# Patient Record
Sex: FEMALE | Race: WHITE | NOT HISPANIC OR LATINO | URBAN - METROPOLITAN AREA
[De-identification: names, ages, dates, MRNs, and addresses within clinical notes are randomized per-mention and may not be internally consistent; named-entity substitution may affect disease eponyms.]

---

## 2017-04-17 ENCOUNTER — EMERGENCY (EMERGENCY)
Facility: HOSPITAL | Age: 52
LOS: 1 days | Discharge: HOME | End: 2017-04-17
Attending: EMERGENCY MEDICINE | Admitting: EMERGENCY MEDICINE
Payer: COMMERCIAL

## 2017-04-17 VITALS
OXYGEN SATURATION: 100 % | SYSTOLIC BLOOD PRESSURE: 149 MMHG | DIASTOLIC BLOOD PRESSURE: 96 MMHG | HEART RATE: 66 BPM | TEMPERATURE: 99 F | RESPIRATION RATE: 16 BRPM

## 2017-04-17 VITALS
OXYGEN SATURATION: 98 % | DIASTOLIC BLOOD PRESSURE: 91 MMHG | HEART RATE: 68 BPM | SYSTOLIC BLOOD PRESSURE: 151 MMHG | RESPIRATION RATE: 16 BRPM

## 2017-04-17 LAB
ALBUMIN SERPL ELPH-MCNC: 4.8 G/DL — SIGNIFICANT CHANGE UP (ref 3.3–5)
ALP SERPL-CCNC: 99 U/L — SIGNIFICANT CHANGE UP (ref 40–120)
ALT FLD-CCNC: 26 U/L — SIGNIFICANT CHANGE UP (ref 4–33)
APAP SERPL-MCNC: < 15 UG/ML — LOW (ref 15–25)
APTT BLD: 32.5 SEC — SIGNIFICANT CHANGE UP (ref 27.5–37.4)
AST SERPL-CCNC: 24 U/L — SIGNIFICANT CHANGE UP (ref 4–32)
BARBITURATES MEASUREMENT: NEGATIVE — SIGNIFICANT CHANGE UP
BENZODIAZ SERPL-MCNC: NEGATIVE — SIGNIFICANT CHANGE UP
BILIRUB SERPL-MCNC: 0.5 MG/DL — SIGNIFICANT CHANGE UP (ref 0.2–1.2)
BUN SERPL-MCNC: 10 MG/DL — SIGNIFICANT CHANGE UP (ref 7–23)
CALCIUM SERPL-MCNC: 9.5 MG/DL — SIGNIFICANT CHANGE UP (ref 8.4–10.5)
CHLORIDE SERPL-SCNC: 103 MMOL/L — SIGNIFICANT CHANGE UP (ref 98–107)
CO2 SERPL-SCNC: 26 MMOL/L — SIGNIFICANT CHANGE UP (ref 22–31)
CREAT SERPL-MCNC: 0.74 MG/DL — SIGNIFICANT CHANGE UP (ref 0.5–1.3)
ETHANOL BLD-MCNC: < 10 MG/DL — SIGNIFICANT CHANGE UP
GLUCOSE SERPL-MCNC: 86 MG/DL — SIGNIFICANT CHANGE UP (ref 70–99)
HCT VFR BLD CALC: 43.2 % — SIGNIFICANT CHANGE UP (ref 34.5–45)
HGB BLD-MCNC: 14.4 G/DL — SIGNIFICANT CHANGE UP (ref 11.5–15.5)
INR BLD: 0.99 — SIGNIFICANT CHANGE UP (ref 0.88–1.17)
MCHC RBC-ENTMCNC: 30.4 PG — SIGNIFICANT CHANGE UP (ref 27–34)
MCHC RBC-ENTMCNC: 33.3 % — SIGNIFICANT CHANGE UP (ref 32–36)
MCV RBC AUTO: 91.3 FL — SIGNIFICANT CHANGE UP (ref 80–100)
PLATELET # BLD AUTO: 241 K/UL — SIGNIFICANT CHANGE UP (ref 150–400)
PMV BLD: 10.1 FL — SIGNIFICANT CHANGE UP (ref 7–13)
POTASSIUM SERPL-MCNC: 4.7 MMOL/L — SIGNIFICANT CHANGE UP (ref 3.5–5.3)
POTASSIUM SERPL-SCNC: 4.7 MMOL/L — SIGNIFICANT CHANGE UP (ref 3.5–5.3)
PROT SERPL-MCNC: 7.7 G/DL — SIGNIFICANT CHANGE UP (ref 6–8.3)
PROTHROM AB SERPL-ACNC: 11.1 SEC — SIGNIFICANT CHANGE UP (ref 9.8–13.1)
RBC # BLD: 4.73 M/UL — SIGNIFICANT CHANGE UP (ref 3.8–5.2)
RBC # FLD: 12.9 % — SIGNIFICANT CHANGE UP (ref 10.3–14.5)
SALICYLATES SERPL-MCNC: < 5 MG/DL — LOW (ref 15–30)
SODIUM SERPL-SCNC: 145 MMOL/L — SIGNIFICANT CHANGE UP (ref 135–145)
WBC # BLD: 8.71 K/UL — SIGNIFICANT CHANGE UP (ref 3.8–10.5)
WBC # FLD AUTO: 8.71 K/UL — SIGNIFICANT CHANGE UP (ref 3.8–10.5)

## 2017-04-17 PROCEDURE — 70450 CT HEAD/BRAIN W/O DYE: CPT | Mod: 26

## 2017-04-17 PROCEDURE — 70486 CT MAXILLOFACIAL W/O DYE: CPT | Mod: 26

## 2017-04-17 PROCEDURE — 72125 CT NECK SPINE W/O DYE: CPT | Mod: 26

## 2017-04-17 PROCEDURE — 99284 EMERGENCY DEPT VISIT MOD MDM: CPT

## 2017-04-17 NOTE — ED PROVIDER NOTE - OBJECTIVE STATEMENT
51F h/o MDD, anxiety presenter after fall. On Friday night, pt accidentally took double dose of her clonazepam and abilify, then could not sleep and took an additional dose. Pt felt groggy and fell at least twice that night, striking against dresser and ground, with subsequent bruising to the face. Pt went about her weekend and saw PMD today who referred to ED for CT after seeing unequal pupils on exam. Denies F, CP/SOB, abd pain, N/V/D, weakness, numbness.

## 2017-04-17 NOTE — ED ADULT TRIAGE NOTE - CHIEF COMPLAINT QUOTE
Pt fell in her house 72hrs ago fell on her face pt noted with ecchymosis of the face and eyes she c/o nausea no vomiting. Pt was sent by her PMD for unsteady gait and unequal pupils.

## 2017-04-17 NOTE — ED PROVIDER NOTE - ATTENDING CONTRIBUTION TO CARE
AJM: Pt seen with resident and agree with above note. 51F h/o MDD, anxiety presenter after fall. On Friday night, pt accidentally took double dose of her clonazepam and abilify, then could not sleep and took an additional dose. Pt felt groggy and fell at least twice that night, striking against dresser and ground, with subsequent bruising to the face. Saw pmd who sent her to ER after noting the right pupil was larger than left. No changes in vision. focal weakness, numbness. Normal gait and tandem gait here. No dysmetria. P raccoon eyes and right subconjunctival hematoma. right pupil slightly larger than left, but reactive.  ct head, face, c spine. dc if normal. symptoms likely related to concussion

## 2017-04-17 NOTE — ED ADULT NURSE NOTE - OBJECTIVE STATEMENT
Pt A+OX4.  Sent by PMD for unequal pupils and unsteady gait.  States she feels fine.  Jostin orbital ecchymosis with R eye edema and avulsion under right eye.  Labs obtained and sent as ordered.  #20g SL L AC placed.  Kept NPO.  Awaiting CT

## 2017-04-17 NOTE — ED PROVIDER NOTE - MEDICAL DECISION MAKING DETAILS
51F w/ above history presenting after multiple falls 3 days ago, concerning for MSK injury vs ICH  -labs, CT

## 2017-04-17 NOTE — ED PROVIDER NOTE - PROGRESS NOTE DETAILS
AJM: Ct shows mild nasal fx. pt told of findings. given copy of results. dc home. pt has pmd and ent followup

## 2018-09-10 ENCOUNTER — EMERGENCY (EMERGENCY)
Facility: HOSPITAL | Age: 53
LOS: 1 days | Discharge: HOME | End: 2018-09-10
Attending: EMERGENCY MEDICINE
Payer: COMMERCIAL

## 2018-09-10 VITALS
DIASTOLIC BLOOD PRESSURE: 85 MMHG | HEART RATE: 75 BPM | TEMPERATURE: 98 F | OXYGEN SATURATION: 96 % | RESPIRATION RATE: 19 BRPM | SYSTOLIC BLOOD PRESSURE: 138 MMHG | HEIGHT: 67 IN | WEIGHT: 190.04 LBS

## 2018-09-10 VITALS
RESPIRATION RATE: 19 BRPM | DIASTOLIC BLOOD PRESSURE: 78 MMHG | SYSTOLIC BLOOD PRESSURE: 135 MMHG | OXYGEN SATURATION: 98 % | HEART RATE: 65 BPM | TEMPERATURE: 98 F

## 2018-09-10 LAB
ALBUMIN SERPL ELPH-MCNC: 4.5 G/DL — SIGNIFICANT CHANGE UP (ref 3.3–5)
ALP SERPL-CCNC: 111 U/L — SIGNIFICANT CHANGE UP (ref 40–120)
ALT FLD-CCNC: 34 U/L — SIGNIFICANT CHANGE UP (ref 10–45)
ANION GAP SERPL CALC-SCNC: 13 MMOL/L — SIGNIFICANT CHANGE UP (ref 5–17)
APPEARANCE UR: CLEAR — SIGNIFICANT CHANGE UP
APTT BLD: 32.9 SEC — SIGNIFICANT CHANGE UP (ref 27.5–37.4)
AST SERPL-CCNC: 24 U/L — SIGNIFICANT CHANGE UP (ref 10–40)
BACTERIA # UR AUTO: NEGATIVE — SIGNIFICANT CHANGE UP
BASOPHILS # BLD AUTO: 0.1 K/UL — SIGNIFICANT CHANGE UP (ref 0–0.2)
BASOPHILS NFR BLD AUTO: 0.7 % — SIGNIFICANT CHANGE UP (ref 0–2)
BILIRUB SERPL-MCNC: 0.2 MG/DL — SIGNIFICANT CHANGE UP (ref 0.2–1.2)
BILIRUB UR-MCNC: NEGATIVE — SIGNIFICANT CHANGE UP
BUN SERPL-MCNC: 13 MG/DL — SIGNIFICANT CHANGE UP (ref 7–23)
CALCIUM SERPL-MCNC: 9.4 MG/DL — SIGNIFICANT CHANGE UP (ref 8.4–10.5)
CHLORIDE SERPL-SCNC: 101 MMOL/L — SIGNIFICANT CHANGE UP (ref 96–108)
CO2 SERPL-SCNC: 25 MMOL/L — SIGNIFICANT CHANGE UP (ref 22–31)
COLOR SPEC: SIGNIFICANT CHANGE UP
CREAT SERPL-MCNC: 0.73 MG/DL — SIGNIFICANT CHANGE UP (ref 0.5–1.3)
DIFF PNL FLD: NEGATIVE — SIGNIFICANT CHANGE UP
EOSINOPHIL # BLD AUTO: 0.3 K/UL — SIGNIFICANT CHANGE UP (ref 0–0.5)
EOSINOPHIL NFR BLD AUTO: 2.8 % — SIGNIFICANT CHANGE UP (ref 0–6)
EPI CELLS # UR: 0 /HPF — SIGNIFICANT CHANGE UP
GLUCOSE SERPL-MCNC: 89 MG/DL — SIGNIFICANT CHANGE UP (ref 70–99)
GLUCOSE UR QL: NEGATIVE — SIGNIFICANT CHANGE UP
HCT VFR BLD CALC: 40.8 % — SIGNIFICANT CHANGE UP (ref 34.5–45)
HGB BLD-MCNC: 14.1 G/DL — SIGNIFICANT CHANGE UP (ref 11.5–15.5)
HYALINE CASTS # UR AUTO: 0 /LPF — SIGNIFICANT CHANGE UP (ref 0–2)
INR BLD: 0.96 RATIO — SIGNIFICANT CHANGE UP (ref 0.88–1.16)
KETONES UR-MCNC: NEGATIVE — SIGNIFICANT CHANGE UP
LEUKOCYTE ESTERASE UR-ACNC: NEGATIVE — SIGNIFICANT CHANGE UP
LYMPHOCYTES # BLD AUTO: 2.8 K/UL — SIGNIFICANT CHANGE UP (ref 1–3.3)
LYMPHOCYTES # BLD AUTO: 29 % — SIGNIFICANT CHANGE UP (ref 13–44)
MCHC RBC-ENTMCNC: 31 PG — SIGNIFICANT CHANGE UP (ref 27–34)
MCHC RBC-ENTMCNC: 34.6 GM/DL — SIGNIFICANT CHANGE UP (ref 32–36)
MCV RBC AUTO: 89.6 FL — SIGNIFICANT CHANGE UP (ref 80–100)
MONOCYTES # BLD AUTO: 0.6 K/UL — SIGNIFICANT CHANGE UP (ref 0–0.9)
MONOCYTES NFR BLD AUTO: 6.4 % — SIGNIFICANT CHANGE UP (ref 2–14)
NEUTROPHILS # BLD AUTO: 5.8 K/UL — SIGNIFICANT CHANGE UP (ref 1.8–7.4)
NEUTROPHILS NFR BLD AUTO: 61.1 % — SIGNIFICANT CHANGE UP (ref 43–77)
NITRITE UR-MCNC: NEGATIVE — SIGNIFICANT CHANGE UP
PH UR: 6.5 — SIGNIFICANT CHANGE UP (ref 5–8)
PLATELET # BLD AUTO: 271 K/UL — SIGNIFICANT CHANGE UP (ref 150–400)
POTASSIUM SERPL-MCNC: 4.2 MMOL/L — SIGNIFICANT CHANGE UP (ref 3.5–5.3)
POTASSIUM SERPL-SCNC: 4.2 MMOL/L — SIGNIFICANT CHANGE UP (ref 3.5–5.3)
PROT SERPL-MCNC: 7.8 G/DL — SIGNIFICANT CHANGE UP (ref 6–8.3)
PROT UR-MCNC: NEGATIVE — SIGNIFICANT CHANGE UP
PROTHROM AB SERPL-ACNC: 10.5 SEC — SIGNIFICANT CHANGE UP (ref 9.8–12.7)
RBC # BLD: 4.55 M/UL — SIGNIFICANT CHANGE UP (ref 3.8–5.2)
RBC # FLD: 12.3 % — SIGNIFICANT CHANGE UP (ref 10.3–14.5)
RBC CASTS # UR COMP ASSIST: 0 /HPF — SIGNIFICANT CHANGE UP (ref 0–4)
SODIUM SERPL-SCNC: 139 MMOL/L — SIGNIFICANT CHANGE UP (ref 135–145)
SP GR SPEC: 1.01 — SIGNIFICANT CHANGE UP (ref 1.01–1.02)
UROBILINOGEN FLD QL: NEGATIVE — SIGNIFICANT CHANGE UP
WBC # BLD: 9.5 K/UL — SIGNIFICANT CHANGE UP (ref 3.8–10.5)
WBC # FLD AUTO: 9.5 K/UL — SIGNIFICANT CHANGE UP (ref 3.8–10.5)
WBC UR QL: 0 /HPF — SIGNIFICANT CHANGE UP (ref 0–5)

## 2018-09-10 PROCEDURE — 85027 COMPLETE CBC AUTOMATED: CPT

## 2018-09-10 PROCEDURE — 85730 THROMBOPLASTIN TIME PARTIAL: CPT

## 2018-09-10 PROCEDURE — 87186 SC STD MICRODIL/AGAR DIL: CPT

## 2018-09-10 PROCEDURE — 85610 PROTHROMBIN TIME: CPT

## 2018-09-10 PROCEDURE — 74177 CT ABD & PELVIS W/CONTRAST: CPT | Mod: 26

## 2018-09-10 PROCEDURE — 96376 TX/PRO/DX INJ SAME DRUG ADON: CPT

## 2018-09-10 PROCEDURE — 99284 EMERGENCY DEPT VISIT MOD MDM: CPT

## 2018-09-10 PROCEDURE — 76705 ECHO EXAM OF ABDOMEN: CPT

## 2018-09-10 PROCEDURE — 99284 EMERGENCY DEPT VISIT MOD MDM: CPT | Mod: 25

## 2018-09-10 PROCEDURE — 81001 URINALYSIS AUTO W/SCOPE: CPT

## 2018-09-10 PROCEDURE — 96374 THER/PROPH/DIAG INJ IV PUSH: CPT

## 2018-09-10 PROCEDURE — 87086 URINE CULTURE/COLONY COUNT: CPT

## 2018-09-10 PROCEDURE — 80053 COMPREHEN METABOLIC PANEL: CPT

## 2018-09-10 PROCEDURE — 76705 ECHO EXAM OF ABDOMEN: CPT | Mod: 26,RT

## 2018-09-10 PROCEDURE — 83690 ASSAY OF LIPASE: CPT

## 2018-09-10 PROCEDURE — 96375 TX/PRO/DX INJ NEW DRUG ADDON: CPT

## 2018-09-10 PROCEDURE — 74177 CT ABD & PELVIS W/CONTRAST: CPT

## 2018-09-10 RX ORDER — FAMOTIDINE 10 MG/ML
20 INJECTION INTRAVENOUS ONCE
Qty: 0 | Refills: 0 | Status: COMPLETED | OUTPATIENT
Start: 2018-09-10 | End: 2018-09-10

## 2018-09-10 RX ORDER — SODIUM CHLORIDE 9 MG/ML
1000 INJECTION, SOLUTION INTRAVENOUS ONCE
Qty: 0 | Refills: 0 | Status: COMPLETED | OUTPATIENT
Start: 2018-09-10 | End: 2018-09-10

## 2018-09-10 RX ORDER — MORPHINE SULFATE 50 MG/1
2 CAPSULE, EXTENDED RELEASE ORAL ONCE
Qty: 0 | Refills: 0 | Status: DISCONTINUED | OUTPATIENT
Start: 2018-09-10 | End: 2018-09-10

## 2018-09-10 RX ORDER — ONDANSETRON 8 MG/1
4 TABLET, FILM COATED ORAL ONCE
Qty: 0 | Refills: 0 | Status: COMPLETED | OUTPATIENT
Start: 2018-09-10 | End: 2018-09-10

## 2018-09-10 RX ORDER — SODIUM CHLORIDE 9 MG/ML
1000 INJECTION, SOLUTION INTRAVENOUS ONCE
Qty: 0 | Refills: 0 | Status: COMPLETED | OUTPATIENT
Start: 2018-09-10 | End: 2019-08-09

## 2018-09-10 RX ADMIN — ONDANSETRON 4 MILLIGRAM(S): 8 TABLET, FILM COATED ORAL at 18:48

## 2018-09-10 RX ADMIN — MORPHINE SULFATE 2 MILLIGRAM(S): 50 CAPSULE, EXTENDED RELEASE ORAL at 18:48

## 2018-09-10 RX ADMIN — SODIUM CHLORIDE 1000 MILLILITER(S): 9 INJECTION, SOLUTION INTRAVENOUS at 18:05

## 2018-09-10 RX ADMIN — FAMOTIDINE 20 MILLIGRAM(S): 10 INJECTION INTRAVENOUS at 18:48

## 2018-09-10 RX ADMIN — MORPHINE SULFATE 2 MILLIGRAM(S): 50 CAPSULE, EXTENDED RELEASE ORAL at 22:55

## 2018-09-10 RX ADMIN — Medication 30 MILLILITER(S): at 18:48

## 2018-09-10 RX ADMIN — MORPHINE SULFATE 2 MILLIGRAM(S): 50 CAPSULE, EXTENDED RELEASE ORAL at 19:30

## 2018-09-10 NOTE — ED PROVIDER NOTE - CARE PLAN
Principal Discharge DX:	Abdominal pain Principal Discharge DX:	Abdominal pain  Assessment and plan of treatment:	1. Take tylenol or motrin as needed for your pain   2. Maintain a bland diet as not to exacerbate your symptoms   3. Follow-up with Dr. Blanc, surgeon, this week for reevaluation and to schedule removal of your gall bladder as outpatient. Call office in morning and report that you were seen in the ER last night and told to call to make appointment for surgery. Phone number and address provided in your discharge   4. Return to the ER for any new or worsening symptoms

## 2018-09-10 NOTE — ED ADULT NURSE NOTE - OBJECTIVE STATEMENT
52 y/o female with PMH of HTN & anxiety presenting to ED for dull RLQ abdominal pain x 4 days. Pt states "I vomitted once today. I went to my OBGYN to rule out anything with my ovaries since the pain is near the area. They did US and said that it was negative. The pain started as a pinch in the center of my belly and then it traveled to the right side. It's dull and nagging and doesn't go away." Upon exam, pt A&Ox3 gross neuro intact, lungs cta bilaterally, no difficulty speaking in complete sentences, s1s2 heart sounds heard, abdomen soft tender to palpation in RLQ, rebound tenderness noted, bowel sounds heard in all 4 quadrants. Pt denies chest pain, sob, ha, n/v/d,  f/c, urinary symptoms, hematuria. Labs drawn & sent.

## 2018-09-10 NOTE — ED PROVIDER NOTE - RAPID ASSESSMENT
Kathy Smith (scribe) scribing for Michael Mathis (MD): 52 y/o F pt with PMHx of HTN, anxiety c/o epigastric pain x3 days and now RLQ pain today, PMD referred pt today to r/o appendicitis. Went to OBGYN this morning because pt thought it was her ovary. Completed US which was normal and was scheduled for CT scan for tomorrow but pt can't wait due to the pain. Pt's BMI is 29.8. Allergic to penicillin (rash) Kathy Smith (scribe) scribing for Michael Mathis (MD): 52 y/o F pt with PMHx of HTN, anxiety c/o epigastric pain x3 days and now RLQ pain today (rated 6/10). Had one episode of vomiting today. PMD referred pt today to r/o appendicitis. Went to OBGYN this morning because pt thought it was her ovary. Completed US which was normal and was scheduled for CT scan for tomorrow but pt can't wait due to the pain. Pt's BMI is 29.8. Allergic to penicillin (rash)

## 2018-09-10 NOTE — ED ADULT NURSE NOTE - NSIMPLEMENTINTERV_GEN_ALL_ED
Implemented All Universal Safety Interventions:  [place] to call system. Call bell, personal items and telephone within reach. Instruct patient to call for assistance. Room bathroom lighting operational. Non-slip footwear when patient is off stretcher. Physically safe environment: no spills, clutter or unnecessary equipment. Stretcher in lowest position, wheels locked, appropriate side rails in place.

## 2018-09-10 NOTE — ED PROVIDER NOTE - PROGRESS NOTE DETAILS
PMH YAP  53y female pmh HTN,Anxiety comes to ed complains of 1wk hx of abd pain began midepigastric and now rad to rlq, Associated with NV. Seen by pmd and had exam and sono that excluded gyn cause. Referred to ED for CT ro appy. PE WDWN female looking mildly acutly ill normocephalic atraumatic chest clear anterior & posterior abd mild rlq ttp without rebound, Neuro no focal defects  Ryan Simms MD, Facep surgery at the bedside. -Kemi Nunez PA-C surgery at the bedside. patient complaining of pain again at this time, will provide 2mg morphine again and reevaluate. US recommending MRI/MRCP to further evaluate adenomymetosis vs malignancy findings. will await surgery recommendations. -Kemi Nunez PA-C surgery at bedside discussion with patient regarding pain, states that she feels much better and does not want to stay at this time. discussion had regarding scheduling of elective cholecystectomy as outpatient, therefore MRCP would not be of diagnostic utility. patient able to tolerate PO, will call Dr. Blanc this week for appointment and understands and agrees with strict return precautions given to her. D/w Dr. Simms and stable for d/c at this time. -SARAH HuntC

## 2018-09-10 NOTE — ED PROVIDER NOTE - NS_ ATTENDINGSCRIBEDETAILS _ED_A_ED_FT
I, Dr. Mathis, saw patient as a rapid assessment initially, rest of care performed by another attending, and rapid assessment documented by scribe in my presence.

## 2018-09-10 NOTE — ED PROVIDER NOTE - MEDICAL DECISION MAKING DETAILS
Abd pain rlq with neg pelvic exam/sono this am present from pmd for same, Concerns for appy.divertic, check labs ct. pain meds ivf and reassess  Ryan Simms MD, Facep

## 2018-09-10 NOTE — ED PROVIDER NOTE - PLAN OF CARE
1. Take tylenol or motrin as needed for your pain   2. Maintain a bland diet as not to exacerbate your symptoms   3. Follow-up with Dr. Blanc, surgeon, this week for reevaluation and to schedule removal of your gall bladder as outpatient. Call office in morning and report that you were seen in the ER last night and told to call to make appointment for surgery. Phone number and address provided in your discharge   4. Return to the ER for any new or worsening symptoms

## 2018-09-10 NOTE — ED ADULT TRIAGE NOTE - CHIEF COMPLAINT QUOTE
sent by PMD dr sunshine for epigastric discomfort, RLQ pain, RUQ tenderness, low grade fever x 3 days, +nausea/vomiting/diarrhea, sent r/o appendicitis

## 2018-09-10 NOTE — ED ADULT NURSE REASSESSMENT NOTE - NS ED NURSE REASSESS COMMENT FT1
Pt remains stable in ED S/P US & CT. CT shows Cholelithiasis , ultrasound showed Complex lesion along the gallbladder fundus. VSS Awaits MD dispo.

## 2018-09-10 NOTE — ED PROVIDER NOTE - OBJECTIVE STATEMENT
52 y/o F with a PMHx HTN presents to the ED c/o 6/10, dull and throbbing RLQ painx3 days. Patient states that this pain began "in the middle of last week" and has since intensified and radiated down to her RLQ. Patient went to her PCP, Dr. Patterson, today and was referred to the ER for further workup. Patient has takenAdvil a day for pain control without relief.  Patient has also been nauseous since the onset of the RLQ pain and has been vomiting and unable to eat. Patient saw OB-GYN and had an ovarian ultrasound which came back normal. Patient states she had a 99.0 fever with chills that has now resolved and she is feeling "flushed." Patient has 8/10 lower back pain which began when the abdominal pain began. She has never had this back pain before. Patient admits to 5/10 pain on defecation and diarrhea that has resolved today. Patient denies headache, chest pain, SOB, dysuria, and vaginal discharge. 52 y/o F with a PMHx HTN presents to the ED c/o 6/10, dull and throbbing RLQ painx3 days. Patient states that this pain began "in the middle of last week" and has since intensified and radiated down to her RLQ. Patient went to her PCP, Dr. Patterson, today for routine bloodwork and medication review but was referred to the ER for further workup of the pain she has been having. Patient has taken Advil for a week for pain control without relief.  Patient has also been nauseous since the onset of the RLQ pain. Patient vomited for the first time today and has been unable to eat. Patient states she had a "low grade 99.0" fever since Friday with chills that has now resolved and she is feeling "flushed." Patient has 8/10 lower back pain which began when the abdominal pain began. She has never had this back pain before. Patient admits to 5/10 pain on defecation and diarrhea that has resolved today. Patient admits to anorexia, fatigue, dizziness, and weakness since symptoms began.  Patient saw OB-GYN Dr. Valenzuela today and had an ovarian ultrasound which came back normal. Patient denies headache, chest pain, SOB, dysuria, and vaginal discharge.

## 2018-09-10 NOTE — ED PROVIDER NOTE - ATTENDING CONTRIBUTION TO CARE
H&P by attending with scribe additional care by pa under my supervison  Ryan Simms MD, Facep Seen with  pa under my supervision.  Ryan Simms MD, Facep

## 2018-09-11 ENCOUNTER — APPOINTMENT (OUTPATIENT)
Dept: MRI IMAGING | Facility: IMAGING CENTER | Age: 53
End: 2018-09-11
Payer: COMMERCIAL

## 2018-09-11 ENCOUNTER — OUTPATIENT (OUTPATIENT)
Dept: OUTPATIENT SERVICES | Facility: HOSPITAL | Age: 53
LOS: 1 days | End: 2018-09-11
Payer: COMMERCIAL

## 2018-09-11 DIAGNOSIS — K82.8 OTHER SPECIFIED DISEASES OF GALLBLADDER: ICD-10-CM

## 2018-09-11 PROCEDURE — 74183 MRI ABD W/O CNTR FLWD CNTR: CPT

## 2018-09-11 PROCEDURE — 74183 MRI ABD W/O CNTR FLWD CNTR: CPT | Mod: 26

## 2018-09-11 PROCEDURE — A9585: CPT

## 2018-09-12 PROBLEM — I10 ESSENTIAL (PRIMARY) HYPERTENSION: Chronic | Status: ACTIVE | Noted: 2018-09-10

## 2018-09-12 PROBLEM — F41.9 ANXIETY DISORDER, UNSPECIFIED: Chronic | Status: ACTIVE | Noted: 2018-09-10

## 2018-09-12 NOTE — ED POST DISCHARGE NOTE - OTHER COMMUNICATION
9/12/18: Preliminary ucx results + 10-49K Klebsiella pneumoniae. Will await final results/sensitivities. - Jose Weiss PA-C

## 2018-09-12 NOTE — ED POST DISCHARGE NOTE - DETAILS
Sensitivities in, pt with some slight dysuria, will give bactrim and follow up with surgery.  Josey Suture Removal: 14 days

## 2018-09-13 ENCOUNTER — APPOINTMENT (OUTPATIENT)
Dept: SURGERY | Facility: CLINIC | Age: 53
End: 2018-09-13
Payer: COMMERCIAL

## 2018-09-13 VITALS
HEIGHT: 67 IN | DIASTOLIC BLOOD PRESSURE: 86 MMHG | HEART RATE: 75 BPM | OXYGEN SATURATION: 99 % | TEMPERATURE: 98.3 F | SYSTOLIC BLOOD PRESSURE: 144 MMHG | WEIGHT: 188 LBS | RESPIRATION RATE: 15 BRPM | BODY MASS INDEX: 29.51 KG/M2

## 2018-09-13 DIAGNOSIS — Z78.9 OTHER SPECIFIED HEALTH STATUS: ICD-10-CM

## 2018-09-13 DIAGNOSIS — Z83.79 FAMILY HISTORY OF OTHER DISEASES OF THE DIGESTIVE SYSTEM: ICD-10-CM

## 2018-09-13 DIAGNOSIS — F41.9 ANXIETY DISORDER, UNSPECIFIED: ICD-10-CM

## 2018-09-13 DIAGNOSIS — C44.90 UNSPECIFIED MALIGNANT NEOPLASM OF SKIN, UNSPECIFIED: ICD-10-CM

## 2018-09-13 DIAGNOSIS — I10 ESSENTIAL (PRIMARY) HYPERTENSION: ICD-10-CM

## 2018-09-13 DIAGNOSIS — Z87.891 PERSONAL HISTORY OF NICOTINE DEPENDENCE: ICD-10-CM

## 2018-09-13 DIAGNOSIS — R10.9 UNSPECIFIED ABDOMINAL PAIN: ICD-10-CM

## 2018-09-13 PROCEDURE — 99244 OFF/OP CNSLTJ NEW/EST MOD 40: CPT

## 2018-09-13 RX ORDER — AZTREONAM 2 G
1 VIAL (EA) INJECTION
Qty: 14 | Refills: 0 | OUTPATIENT
Start: 2018-09-13 | End: 2018-09-19

## 2018-09-13 RX ORDER — SERTRALINE HYDROCHLORIDE 100 MG/1
100 TABLET, FILM COATED ORAL
Refills: 0 | Status: ACTIVE | COMMUNITY

## 2018-09-13 RX ORDER — LOSARTAN POTASSIUM 50 MG/1
50 TABLET, FILM COATED ORAL
Refills: 0 | Status: ACTIVE | COMMUNITY

## 2018-09-19 ENCOUNTER — OUTPATIENT (OUTPATIENT)
Dept: OUTPATIENT SERVICES | Facility: HOSPITAL | Age: 53
LOS: 1 days | End: 2018-09-19
Payer: COMMERCIAL

## 2018-09-19 VITALS
RESPIRATION RATE: 18 BRPM | WEIGHT: 188.94 LBS | SYSTOLIC BLOOD PRESSURE: 141 MMHG | DIASTOLIC BLOOD PRESSURE: 89 MMHG | TEMPERATURE: 98 F | OXYGEN SATURATION: 97 % | HEART RATE: 78 BPM | HEIGHT: 67 IN

## 2018-09-19 DIAGNOSIS — Z98.890 OTHER SPECIFIED POSTPROCEDURAL STATES: Chronic | ICD-10-CM

## 2018-09-19 DIAGNOSIS — Z01.818 ENCOUNTER FOR OTHER PREPROCEDURAL EXAMINATION: ICD-10-CM

## 2018-09-19 DIAGNOSIS — K80.20 CALCULUS OF GALLBLADDER WITHOUT CHOLECYSTITIS WITHOUT OBSTRUCTION: ICD-10-CM

## 2018-09-19 DIAGNOSIS — I10 ESSENTIAL (PRIMARY) HYPERTENSION: ICD-10-CM

## 2018-09-19 PROCEDURE — G0463: CPT

## 2018-09-19 RX ORDER — VANCOMYCIN HCL 1 G
1250 VIAL (EA) INTRAVENOUS ONCE
Qty: 0 | Refills: 0 | Status: DISCONTINUED | OUTPATIENT
Start: 2018-09-24 | End: 2018-10-09

## 2018-09-19 NOTE — H&P PST ADULT - HISTORY OF PRESENT ILLNESS
53yr old female presents to [facility] for a Laparoscopic Cholecystectomy with ICG on 9/24/18. 53yr old female presents to PST for a Laparoscopic Cholecystectomy with ICG on 9/24/18. PMH: HTN, Anxiety, Smoker  and Calculus of Gallbladder. Pt states she was having abdominal pain about 2 wks prior which brought her to ER. She denies chest pain or SOB and feels well otherwise.

## 2018-09-19 NOTE — H&P PST ADULT - PMH
Anxiety    Calculus of gallbladder without cholecystitis    Hepatitis C  ? pt unsure... said she think she was treated in 4/2018  HTN (hypertension)    Smoker

## 2018-09-19 NOTE — H&P PST ADULT - NSANTHOSAYNRD_GEN_A_CORE
No. DECLAN screening performed.  STOP BANG Legend: 0-2 = LOW Risk; 3-4 = INTERMEDIATE Risk; 5-8 = HIGH Risk

## 2018-09-23 ENCOUNTER — TRANSCRIPTION ENCOUNTER (OUTPATIENT)
Age: 53
End: 2018-09-23

## 2018-09-24 ENCOUNTER — OUTPATIENT (OUTPATIENT)
Dept: OUTPATIENT SERVICES | Facility: HOSPITAL | Age: 53
LOS: 1 days | End: 2018-09-24
Payer: COMMERCIAL

## 2018-09-24 ENCOUNTER — RESULT REVIEW (OUTPATIENT)
Age: 53
End: 2018-09-24

## 2018-09-24 ENCOUNTER — APPOINTMENT (OUTPATIENT)
Dept: SURGERY | Facility: HOSPITAL | Age: 53
End: 2018-09-24
Payer: COMMERCIAL

## 2018-09-24 VITALS
DIASTOLIC BLOOD PRESSURE: 87 MMHG | SYSTOLIC BLOOD PRESSURE: 128 MMHG | OXYGEN SATURATION: 96 % | RESPIRATION RATE: 18 BRPM | TEMPERATURE: 98 F | HEART RATE: 77 BPM

## 2018-09-24 DIAGNOSIS — K80.20 CALCULUS OF GALLBLADDER WITHOUT CHOLECYSTITIS WITHOUT OBSTRUCTION: ICD-10-CM

## 2018-09-24 DIAGNOSIS — Z98.890 OTHER SPECIFIED POSTPROCEDURAL STATES: Chronic | ICD-10-CM

## 2018-09-24 LAB — HCG UR QL: NEGATIVE — SIGNIFICANT CHANGE UP

## 2018-09-24 PROCEDURE — 88304 TISSUE EXAM BY PATHOLOGIST: CPT | Mod: 26

## 2018-09-24 PROCEDURE — 47562 LAPAROSCOPIC CHOLECYSTECTOMY: CPT

## 2018-09-24 RX ORDER — OXYCODONE HYDROCHLORIDE 5 MG/1
5 TABLET ORAL ONCE
Qty: 0 | Refills: 0 | Status: DISCONTINUED | OUTPATIENT
Start: 2018-09-24 | End: 2018-09-24

## 2018-09-24 RX ORDER — LIDOCAINE HCL 20 MG/ML
0.2 VIAL (ML) INJECTION ONCE
Qty: 0 | Refills: 0 | Status: DISCONTINUED | OUTPATIENT
Start: 2018-09-24 | End: 2018-09-24

## 2018-09-24 RX ORDER — SODIUM CHLORIDE 9 MG/ML
3 INJECTION INTRAMUSCULAR; INTRAVENOUS; SUBCUTANEOUS EVERY 8 HOURS
Qty: 0 | Refills: 0 | Status: DISCONTINUED | OUTPATIENT
Start: 2018-09-24 | End: 2018-09-24

## 2018-09-24 RX ORDER — OXYCODONE HYDROCHLORIDE 5 MG/1
1 TABLET ORAL
Qty: 15 | Refills: 0
Start: 2018-09-24

## 2018-09-24 RX ORDER — HYDROMORPHONE HYDROCHLORIDE 2 MG/ML
0.5 INJECTION INTRAMUSCULAR; INTRAVENOUS; SUBCUTANEOUS
Qty: 0 | Refills: 0 | Status: DISCONTINUED | OUTPATIENT
Start: 2018-09-24 | End: 2018-09-25

## 2018-09-24 RX ORDER — SODIUM CHLORIDE 9 MG/ML
1000 INJECTION, SOLUTION INTRAVENOUS
Qty: 0 | Refills: 0 | Status: DISCONTINUED | OUTPATIENT
Start: 2018-09-24 | End: 2018-10-09

## 2018-09-24 RX ADMIN — HYDROMORPHONE HYDROCHLORIDE 0.5 MILLIGRAM(S): 2 INJECTION INTRAMUSCULAR; INTRAVENOUS; SUBCUTANEOUS at 20:20

## 2018-09-24 RX ADMIN — HYDROMORPHONE HYDROCHLORIDE 0.5 MILLIGRAM(S): 2 INJECTION INTRAMUSCULAR; INTRAVENOUS; SUBCUTANEOUS at 20:35

## 2018-09-24 RX ADMIN — HYDROMORPHONE HYDROCHLORIDE 0.5 MILLIGRAM(S): 2 INJECTION INTRAMUSCULAR; INTRAVENOUS; SUBCUTANEOUS at 19:00

## 2018-09-24 RX ADMIN — HYDROMORPHONE HYDROCHLORIDE 0.5 MILLIGRAM(S): 2 INJECTION INTRAMUSCULAR; INTRAVENOUS; SUBCUTANEOUS at 18:45

## 2018-09-24 RX ADMIN — OXYCODONE HYDROCHLORIDE 5 MILLIGRAM(S): 5 TABLET ORAL at 21:55

## 2018-09-24 RX ADMIN — OXYCODONE HYDROCHLORIDE 5 MILLIGRAM(S): 5 TABLET ORAL at 22:25

## 2018-09-24 NOTE — ASU DISCHARGE PLAN (ADULT/PEDIATRIC). - NOTIFY
Fever greater than 101/Persistent Nausea and Vomiting/Bleeding that does not stop/Inability to Tolerate Liquids or Foods/Swelling that continues

## 2018-09-24 NOTE — ASU DISCHARGE PLAN (ADULT/PEDIATRIC). - ITEMS TO FOLLOWUP WITH YOUR PHYSICIAN'S
Please follow up with Dr. Chaves within 2 weeks of your discharge, please call office to set up the appointment

## 2018-09-24 NOTE — ASU DISCHARGE PLAN (ADULT/PEDIATRIC). - MEDICATION SUMMARY - MEDICATIONS TO TAKE
I will START or STAY ON the medications listed below when I get home from the hospital:    oxyCODONE 5 mg oral tablet  -- 1 tab(s) by mouth every 4 hours MDD:6  -- Caution federal law prohibits the transfer of this drug to any person other  than the person for whom it was prescribed.  It is very important that you take or use this exactly as directed.  Do not skip doses or discontinue unless directed by your doctor.  May cause drowsiness.  Alcohol may intensify this effect.  Use care when operating dangerous machinery.  This prescription cannot be refilled.  Using more of this medication than prescribed may cause serious breathing problems.    -- Indication: For Post-op pain    losartan 50 mg oral tablet  -- 1 tab(s) by mouth once a day  -- Indication: For hypertension    Tenuate 25 mg oral tablet  -- 1 tab(s) by mouth 3 times a day  -- Indication: For anxiety    clonazePAM 1 mg oral tablet  -- Indication: For anxiety    Zoloft 100 mg oral tablet  -- 1 tab(s) by mouth once a day  -- Indication: For anxiety    Abilify 5 mg oral tablet  -- 1 tab(s) by mouth once a day  -- Indication: For anxiety    Norvasc 5 mg oral tablet  -- 1 tab(s) by mouth once a day  -- Indication: For hypertension

## 2018-09-24 NOTE — BRIEF OPERATIVE NOTE - PROCEDURE
<<-----Click on this checkbox to enter Procedure Laparoscopic cholecystectomy  09/24/2018    Active  [id number]

## 2018-09-24 NOTE — ASU DISCHARGE PLAN (ADULT/PEDIATRIC). - SPECIAL INSTRUCTIONS
Please take off the outer dressing in 48 hours and leave the steri-strips underneath intact. You can shower over the Tegaderm tomorrow.

## 2018-09-24 NOTE — BRIEF OPERATIVE NOTE - OPERATION/FINDINGS
Nongangrenous gallbladder. Critical view obtained and cystic duct verified with ICG. Cystic duct and artery clipped and divided sharped. Gallbladder dissected off the liver and hemostasis achieved at the end of the case.

## 2018-09-25 VITALS
OXYGEN SATURATION: 96 % | DIASTOLIC BLOOD PRESSURE: 75 MMHG | SYSTOLIC BLOOD PRESSURE: 115 MMHG | HEART RATE: 72 BPM | RESPIRATION RATE: 17 BRPM | TEMPERATURE: 97 F

## 2018-09-25 PROBLEM — K80.20 CALCULUS OF GALLBLADDER WITHOUT CHOLECYSTITIS WITHOUT OBSTRUCTION: Chronic | Status: ACTIVE | Noted: 2018-09-19

## 2018-09-25 PROCEDURE — 47562 LAPAROSCOPIC CHOLECYSTECTOMY: CPT

## 2018-09-25 PROCEDURE — 81025 URINE PREGNANCY TEST: CPT

## 2018-09-25 PROCEDURE — 88304 TISSUE EXAM BY PATHOLOGIST: CPT

## 2018-09-25 RX ORDER — OXYCODONE HYDROCHLORIDE 5 MG/1
10 TABLET ORAL ONCE
Qty: 0 | Refills: 0 | Status: DISCONTINUED | OUTPATIENT
Start: 2018-09-25 | End: 2018-09-25

## 2018-09-25 RX ORDER — ACETAMINOPHEN 500 MG
1000 TABLET ORAL ONCE
Qty: 0 | Refills: 0 | Status: COMPLETED | OUTPATIENT
Start: 2018-09-25 | End: 2018-09-25

## 2018-09-25 RX ORDER — OXYCODONE HYDROCHLORIDE 5 MG/1
10 TABLET ORAL EVERY 4 HOURS
Qty: 0 | Refills: 0 | Status: DISCONTINUED | OUTPATIENT
Start: 2018-09-25 | End: 2018-09-25

## 2018-09-25 RX ADMIN — Medication 400 MILLIGRAM(S): at 01:20

## 2018-09-25 RX ADMIN — Medication 1000 MILLIGRAM(S): at 01:50

## 2018-09-25 NOTE — CHART NOTE - NSCHARTNOTEFT_GEN_A_CORE
General Surgery Post-Op Note    SUBJECTIVE:  This is a 53y Female POD 0 s/p lap cholecystectomy. Doing well overall. Denies flatus, BM, N/V. Pain tolerated with oxycodone and tylenol.     OBJECTIVE:     ** VITAL SIGNS / I&O's **    Vital Signs Last 24 Hrs  T(C): 36.6 (25 Sep 2018 00:00), Max: 36.7 (24 Sep 2018 11:56)  T(F): 97.9 (25 Sep 2018 00:00), Max: 98.1 (24 Sep 2018 11:56)  HR: 66 (25 Sep 2018 00:00) (65 - 80)  BP: 120/58 (25 Sep 2018 00:00) (113/61 - 140/74)  BP(mean): 81 (24 Sep 2018 21:00) (81 - 98)  RR: 15 (25 Sep 2018 00:00) (14 - 18)  SpO2: 94% (25 Sep 2018 00:00) (94% - 100%)      24 Sep 2018 07:01  -  25 Sep 2018 01:12  --------------------------------------------------------  IN:    lactated ringers.: 150 mL  Total IN: 150 mL    OUT:  Total OUT: 0 mL    Total NET: 150 mL          ** PHYSICAL EXAM **    -- CONSTITUTIONAL: Alert, in NAD  -- PULMONARY: non-labored respirations  -- ABDOMEN: soft, non-distended, appropriately TTP, dressings c/d/i  -- NEURO: A&Ox3    ** LABS **              CAPILLARY BLOOD GLUCOSE                    MEDICATIONS  (STANDING):  acetaminophen  IVPB .. 1000 milliGRAM(s) IV Intermittent once  lactated ringers. 1000 milliLiter(s) (75 mL/Hr) IV Continuous <Continuous>  vancomycin  IVPB 1250 milliGRAM(s) IV Intermittent once    MEDICATIONS  (PRN):  HYDROmorphone  Injectable 0.5 milliGRAM(s) IV Push every 10 minutes PRN Moderate Pain (4 - 6)  oxyCODONE    IR 10 milliGRAM(s) Oral once PRN Moderate Pain (4 - 6)        Assessment:  This is a 53yFemale POD 0 s/p lap sudhir.    Plan:  - Diet: CLD, adv to Reg as tolerated  - OOB, ambulate as tolerated  - Encourage use of IS  - Monitor dressings  - DVT ppx    Mitzy Jenkins, PGY-1  General Surgery Green Team [phone] General Surgery Post-Op Note    SUBJECTIVE:  This is a 53y Female POD 0 s/p lap cholecystectomy. Doing well overall. Denies flatus, BM, N/V. Pain tolerated with oxycodone and tylenol.     OBJECTIVE:     ** VITAL SIGNS / I&O's **    Vital Signs Last 24 Hrs  T(C): 36.6 (25 Sep 2018 00:00), Max: 36.7 (24 Sep 2018 11:56)  T(F): 97.9 (25 Sep 2018 00:00), Max: 98.1 (24 Sep 2018 11:56)  HR: 66 (25 Sep 2018 00:00) (65 - 80)  BP: 120/58 (25 Sep 2018 00:00) (113/61 - 140/74)  BP(mean): 81 (24 Sep 2018 21:00) (81 - 98)  RR: 15 (25 Sep 2018 00:00) (14 - 18)  SpO2: 94% (25 Sep 2018 00:00) (94% - 100%)      24 Sep 2018 07:01  -  25 Sep 2018 01:12  --------------------------------------------------------  IN:    lactated ringers.: 150 mL  Total IN: 150 mL    OUT:  Total OUT: 0 mL    Total NET: 150 mL          ** PHYSICAL EXAM **    -- CONSTITUTIONAL: Alert, in NAD  -- PULMONARY: non-labored respirations  -- ABDOMEN: soft, non-distended, appropriately TTP, dressings c/d/i  -- NEURO: A&Ox3    ** LABS **              CAPILLARY BLOOD GLUCOSE                    MEDICATIONS  (STANDING):  acetaminophen  IVPB .. 1000 milliGRAM(s) IV Intermittent once  lactated ringers. 1000 milliLiter(s) (75 mL/Hr) IV Continuous <Continuous>  vancomycin  IVPB 1250 milliGRAM(s) IV Intermittent once    MEDICATIONS  (PRN):  HYDROmorphone  Injectable 0.5 milliGRAM(s) IV Push every 10 minutes PRN Moderate Pain (4 - 6)  oxyCODONE    IR 10 milliGRAM(s) Oral once PRN Moderate Pain (4 - 6)        Assessment:  This is a 53yFemale POD 0 s/p lap sudhir. Was able to be d/c from PACU to home but pt and pt's family wanted to stay overnight for pain control.     Plan:  - Diet: CLD, adv to Reg as tolerated  - Pain control with oxycodone and tylenol  - OOB, ambulate as tolerated  - Encourage use of IS  - Monitor dressings  - DVT ppx    Mitzy Jenkins, PGY-1  General Surgery Green Team [phone]

## 2018-09-26 ENCOUNTER — TRANSCRIPTION ENCOUNTER (OUTPATIENT)
Age: 53
End: 2018-09-26

## 2018-10-02 LAB — SURGICAL PATHOLOGY STUDY: SIGNIFICANT CHANGE UP

## 2018-10-15 PROBLEM — Z87.19 HISTORY OF CHOLELITHIASIS: Status: RESOLVED | Noted: 2018-10-15 | Resolved: 2018-10-15

## 2018-10-15 PROBLEM — Z87.19 HISTORY OF CHRONIC CHOLECYSTITIS: Status: RESOLVED | Noted: 2018-10-15 | Resolved: 2018-10-15

## 2018-10-16 ENCOUNTER — APPOINTMENT (OUTPATIENT)
Dept: SURGERY | Facility: CLINIC | Age: 53
End: 2018-10-16
Payer: COMMERCIAL

## 2018-10-16 VITALS
DIASTOLIC BLOOD PRESSURE: 80 MMHG | SYSTOLIC BLOOD PRESSURE: 123 MMHG | OXYGEN SATURATION: 99 % | HEIGHT: 67 IN | HEART RATE: 67 BPM | TEMPERATURE: 98 F | BODY MASS INDEX: 28.56 KG/M2 | RESPIRATION RATE: 14 BRPM | WEIGHT: 182 LBS

## 2018-10-16 DIAGNOSIS — Z87.19 PERSONAL HISTORY OF OTHER DISEASES OF THE DIGESTIVE SYSTEM: ICD-10-CM

## 2018-10-16 PROCEDURE — 99024 POSTOP FOLLOW-UP VISIT: CPT

## 2018-10-16 RX ORDER — AMLODIPINE BESYLATE 5 MG/1
5 TABLET ORAL
Refills: 0 | Status: ACTIVE | COMMUNITY

## 2019-01-06 ENCOUNTER — FORM ENCOUNTER (OUTPATIENT)
Age: 54
End: 2019-01-06

## 2019-04-01 ENCOUNTER — TRANSCRIPTION ENCOUNTER (OUTPATIENT)
Age: 54
End: 2019-04-01

## 2019-06-11 ENCOUNTER — APPOINTMENT (OUTPATIENT)
Dept: SURGERY | Facility: CLINIC | Age: 54
End: 2019-06-11
Payer: COMMERCIAL

## 2019-06-11 VITALS
DIASTOLIC BLOOD PRESSURE: 91 MMHG | OXYGEN SATURATION: 98 % | BODY MASS INDEX: 29.51 KG/M2 | RESPIRATION RATE: 17 BRPM | WEIGHT: 188 LBS | TEMPERATURE: 98.8 F | HEART RATE: 80 BPM | SYSTOLIC BLOOD PRESSURE: 151 MMHG | HEIGHT: 67 IN

## 2019-06-11 PROCEDURE — 99215 OFFICE O/P EST HI 40 MIN: CPT

## 2019-06-11 RX ORDER — AMLODIPINE BESYLATE 5 MG/1
5 TABLET ORAL
Refills: 0 | Status: DISCONTINUED | COMMUNITY
End: 2019-06-11

## 2019-06-11 NOTE — PHYSICAL EXAM
[JVD] : no jugular venous distention  [Normal Breath Sounds] : Normal breath sounds [Normal Heart Sounds] : normal heart sounds [Abdomen Tenderness] : ~T ~M No abdominal tenderness [Abdominal Masses] : No abdominal masses [No Rash or Lesion] : No rash or lesion [Alert] : alert [No HSM] : no hepatosplenomegaly [Oriented to Place] : oriented to place [Oriented to Person] : oriented to person [Oriented to Time] : oriented to time [Calm] : calm [de-identified] : Developed well-nourished white female in no acute distress. [de-identified] : wnl [de-identified] : Normal strength and gait. [de-identified] : There is an incisional hernia in the area of the supraumbilical trocar site. It is reducible.

## 2019-06-11 NOTE — HISTORY OF PRESENT ILLNESS
[de-identified] : TRENT FRAZIER is a 54 y/o female seen for a follow up visit. Pt had Laparoscopic cholecystectomy on 09/24/18. Today pt reports a lump in the abdomen by the sx site. Patient states that she notices a lump when she sits up or standing when she lies down the lump is gone. She denies any pain nausea or vomiting.

## 2019-06-11 NOTE — ASSESSMENT
[FreeTextEntry1] : I have informed the patient that she has an incisional hernia through the supraumbilical trocar site. I told her that this should be repaired. I also told her that mesh will be used in the repair. She understands and will be done as an outpatient. 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 She will call the office to schedule her surgery.

## 2019-06-11 NOTE — CONSULT LETTER
[Dear  ___] : Dear  [unfilled], [Consult Letter:] : I had the pleasure of evaluating your patient, [unfilled]. [Please see my note below.] : Please see my note below. [Sincerely,] : Sincerely, [Consult Closing:] : Thank you very much for allowing me to participate in the care of this patient.  If you have any questions, please do not hesitate to contact me. [FreeTextEntry3] : I have reviewed all the documentation for this encounter with the patient and have edited where appropriate\par \par Dr. Chidi Chaves

## 2019-06-20 ENCOUNTER — OUTPATIENT (OUTPATIENT)
Dept: OUTPATIENT SERVICES | Facility: HOSPITAL | Age: 54
LOS: 1 days | End: 2019-06-20
Payer: COMMERCIAL

## 2019-06-20 VITALS
HEIGHT: 67 IN | RESPIRATION RATE: 18 BRPM | OXYGEN SATURATION: 96 % | TEMPERATURE: 98 F | DIASTOLIC BLOOD PRESSURE: 89 MMHG | SYSTOLIC BLOOD PRESSURE: 136 MMHG | HEART RATE: 72 BPM | WEIGHT: 190.04 LBS

## 2019-06-20 DIAGNOSIS — Z98.890 OTHER SPECIFIED POSTPROCEDURAL STATES: Chronic | ICD-10-CM

## 2019-06-20 DIAGNOSIS — K43.2 INCISIONAL HERNIA WITHOUT OBSTRUCTION OR GANGRENE: ICD-10-CM

## 2019-06-20 DIAGNOSIS — Z01.818 ENCOUNTER FOR OTHER PREPROCEDURAL EXAMINATION: ICD-10-CM

## 2019-06-20 DIAGNOSIS — Z90.49 ACQUIRED ABSENCE OF OTHER SPECIFIED PARTS OF DIGESTIVE TRACT: Chronic | ICD-10-CM

## 2019-06-20 LAB
ANION GAP SERPL CALC-SCNC: 14 MMOL/L — SIGNIFICANT CHANGE UP (ref 5–17)
BUN SERPL-MCNC: 13 MG/DL — SIGNIFICANT CHANGE UP (ref 7–23)
CALCIUM SERPL-MCNC: 10.2 MG/DL — SIGNIFICANT CHANGE UP (ref 8.4–10.5)
CHLORIDE SERPL-SCNC: 102 MMOL/L — SIGNIFICANT CHANGE UP (ref 96–108)
CO2 SERPL-SCNC: 22 MMOL/L — SIGNIFICANT CHANGE UP (ref 22–31)
CREAT SERPL-MCNC: 0.6 MG/DL — SIGNIFICANT CHANGE UP (ref 0.5–1.3)
GLUCOSE SERPL-MCNC: 87 MG/DL — SIGNIFICANT CHANGE UP (ref 70–99)
POTASSIUM SERPL-MCNC: 3.7 MMOL/L — SIGNIFICANT CHANGE UP (ref 3.5–5.3)
POTASSIUM SERPL-SCNC: 3.7 MMOL/L — SIGNIFICANT CHANGE UP (ref 3.5–5.3)
SODIUM SERPL-SCNC: 138 MMOL/L — SIGNIFICANT CHANGE UP (ref 135–145)

## 2019-06-20 PROCEDURE — 87641 MR-STAPH DNA AMP PROBE: CPT

## 2019-06-20 PROCEDURE — 80048 BASIC METABOLIC PNL TOTAL CA: CPT

## 2019-06-20 PROCEDURE — 87640 STAPH A DNA AMP PROBE: CPT

## 2019-06-20 PROCEDURE — 86803 HEPATITIS C AB TEST: CPT

## 2019-06-20 PROCEDURE — G0463: CPT

## 2019-06-20 PROCEDURE — 85027 COMPLETE CBC AUTOMATED: CPT

## 2019-06-20 RX ORDER — LOSARTAN POTASSIUM 100 MG/1
1 TABLET, FILM COATED ORAL
Qty: 0 | Refills: 0 | DISCHARGE

## 2019-06-20 RX ORDER — CLONAZEPAM 1 MG
0 TABLET ORAL
Qty: 0 | Refills: 0 | DISCHARGE

## 2019-06-20 RX ORDER — DIETHYLPROPION HCL 25 MG
1 TABLET ORAL
Qty: 0 | Refills: 0 | DISCHARGE

## 2019-06-20 NOTE — H&P PST ADULT - HISTORY OF PRESENT ILLNESS
55 y/o F PMH mild DECLAN, no treatment, open cholecystectomy 10/2018 now c/b incisional hernia.  Presents today for incisional hernia repair. 55 y/o F PMH mild DECLAN, no treatment, open cholecystectomy 10/2018 now c/b incisional hernia that occurred 2 months ago.  Presents today for incisional hernia repair.

## 2019-06-20 NOTE — H&P PST ADULT - NSICDXPASTMEDICALHX_GEN_ALL_CORE_FT
PAST MEDICAL HISTORY:  Anxiety     Calculus of gallbladder without cholecystitis     HTN (hypertension)     Smoker

## 2019-06-21 LAB
HCT VFR BLD CALC: 44 % — SIGNIFICANT CHANGE UP (ref 34.5–45)
HCV AB S/CO SERPL IA: 0.35 S/CO — SIGNIFICANT CHANGE UP (ref 0–0.99)
HCV AB SERPL-IMP: SIGNIFICANT CHANGE UP
HGB BLD-MCNC: 14.3 G/DL — SIGNIFICANT CHANGE UP (ref 11.5–15.5)
MCHC RBC-ENTMCNC: 29.7 PG — SIGNIFICANT CHANGE UP (ref 27–34)
MCHC RBC-ENTMCNC: 32.5 GM/DL — SIGNIFICANT CHANGE UP (ref 32–36)
MCV RBC AUTO: 91.5 FL — SIGNIFICANT CHANGE UP (ref 80–100)
MRSA PCR RESULT.: SIGNIFICANT CHANGE UP
PLATELET # BLD AUTO: 259 K/UL — SIGNIFICANT CHANGE UP (ref 150–400)
RBC # BLD: 4.81 M/UL — SIGNIFICANT CHANGE UP (ref 3.8–5.2)
RBC # FLD: 13 % — SIGNIFICANT CHANGE UP (ref 10.3–14.5)
S AUREUS DNA NOSE QL NAA+PROBE: SIGNIFICANT CHANGE UP
WBC # BLD: 11.55 K/UL — HIGH (ref 3.8–10.5)
WBC # FLD AUTO: 11.55 K/UL — HIGH (ref 3.8–10.5)

## 2019-06-27 ENCOUNTER — TRANSCRIPTION ENCOUNTER (OUTPATIENT)
Age: 54
End: 2019-06-27

## 2019-06-28 ENCOUNTER — RESULT REVIEW (OUTPATIENT)
Age: 54
End: 2019-06-28

## 2019-06-28 ENCOUNTER — APPOINTMENT (OUTPATIENT)
Dept: SURGERY | Facility: HOSPITAL | Age: 54
End: 2019-06-28
Payer: COMMERCIAL

## 2019-06-28 ENCOUNTER — OUTPATIENT (OUTPATIENT)
Dept: OUTPATIENT SERVICES | Facility: HOSPITAL | Age: 54
LOS: 1 days | End: 2019-06-28
Payer: COMMERCIAL

## 2019-06-28 VITALS
SYSTOLIC BLOOD PRESSURE: 112 MMHG | RESPIRATION RATE: 16 BRPM | DIASTOLIC BLOOD PRESSURE: 68 MMHG | TEMPERATURE: 98 F | HEART RATE: 62 BPM | OXYGEN SATURATION: 99 %

## 2019-06-28 VITALS
DIASTOLIC BLOOD PRESSURE: 75 MMHG | SYSTOLIC BLOOD PRESSURE: 114 MMHG | HEIGHT: 67 IN | OXYGEN SATURATION: 97 % | WEIGHT: 190.04 LBS | HEART RATE: 71 BPM | RESPIRATION RATE: 18 BRPM | TEMPERATURE: 98 F

## 2019-06-28 DIAGNOSIS — Z98.890 OTHER SPECIFIED POSTPROCEDURAL STATES: Chronic | ICD-10-CM

## 2019-06-28 DIAGNOSIS — Z01.818 ENCOUNTER FOR OTHER PREPROCEDURAL EXAMINATION: ICD-10-CM

## 2019-06-28 DIAGNOSIS — Z90.49 ACQUIRED ABSENCE OF OTHER SPECIFIED PARTS OF DIGESTIVE TRACT: Chronic | ICD-10-CM

## 2019-06-28 DIAGNOSIS — K43.2 INCISIONAL HERNIA WITHOUT OBSTRUCTION OR GANGRENE: ICD-10-CM

## 2019-06-28 LAB — HCG UR QL: NEGATIVE — SIGNIFICANT CHANGE UP

## 2019-06-28 PROCEDURE — 88304 TISSUE EXAM BY PATHOLOGIST: CPT | Mod: 26

## 2019-06-28 PROCEDURE — 81025 URINE PREGNANCY TEST: CPT

## 2019-06-28 PROCEDURE — 49568: CPT

## 2019-06-28 PROCEDURE — 88304 TISSUE EXAM BY PATHOLOGIST: CPT

## 2019-06-28 PROCEDURE — C1781: CPT

## 2019-06-28 PROCEDURE — 88302 TISSUE EXAM BY PATHOLOGIST: CPT | Mod: 26

## 2019-06-28 PROCEDURE — 49560: CPT

## 2019-06-28 PROCEDURE — 88302 TISSUE EXAM BY PATHOLOGIST: CPT

## 2019-06-28 RX ORDER — HYDROMORPHONE HYDROCHLORIDE 2 MG/ML
0.5 INJECTION INTRAMUSCULAR; INTRAVENOUS; SUBCUTANEOUS
Refills: 0 | Status: DISCONTINUED | OUTPATIENT
Start: 2019-06-28 | End: 2019-06-28

## 2019-06-28 RX ORDER — CELECOXIB 200 MG/1
200 CAPSULE ORAL ONCE
Refills: 0 | Status: COMPLETED | OUTPATIENT
Start: 2019-06-28 | End: 2019-06-28

## 2019-06-28 RX ORDER — SODIUM CHLORIDE 9 MG/ML
3 INJECTION INTRAMUSCULAR; INTRAVENOUS; SUBCUTANEOUS EVERY 8 HOURS
Refills: 0 | Status: DISCONTINUED | OUTPATIENT
Start: 2019-06-28 | End: 2019-06-28

## 2019-06-28 RX ORDER — ONDANSETRON 8 MG/1
4 TABLET, FILM COATED ORAL ONCE
Refills: 0 | Status: DISCONTINUED | OUTPATIENT
Start: 2019-06-28 | End: 2019-06-28

## 2019-06-28 RX ORDER — CHLORHEXIDINE GLUCONATE 213 G/1000ML
1 SOLUTION TOPICAL ONCE
Refills: 0 | Status: DISCONTINUED | OUTPATIENT
Start: 2019-06-28 | End: 2019-06-28

## 2019-06-28 RX ORDER — LIDOCAINE HCL 20 MG/ML
0.2 VIAL (ML) INJECTION ONCE
Refills: 0 | Status: DISCONTINUED | OUTPATIENT
Start: 2019-06-28 | End: 2019-06-28

## 2019-06-28 RX ORDER — ACETAMINOPHEN 500 MG
1000 TABLET ORAL ONCE
Refills: 0 | Status: COMPLETED | OUTPATIENT
Start: 2019-06-28 | End: 2019-06-28

## 2019-06-28 RX ADMIN — Medication 1000 MILLIGRAM(S): at 12:09

## 2019-06-28 RX ADMIN — CELECOXIB 200 MILLIGRAM(S): 200 CAPSULE ORAL at 12:08

## 2019-06-28 NOTE — ASU DISCHARGE PLAN (ADULT/PEDIATRIC) - CARE PROVIDER_API CALL
Chidi Chaves)  Surgery  [address] [facility], [address]  [place] [address]  Phone: [phone]  Fax: [phone]  Follow Up Time:

## 2019-06-28 NOTE — ASU DISCHARGE PLAN (ADULT/PEDIATRIC) - ASU DC SPECIAL INSTRUCTIONSFT
WOUND CARE:   BATHING: Please do not submerge wound underwater. You may shower and/or sponge bathe.  ACTIVITY: No heavy lifting or straining. Otherwise, you may return to your usual level of physical activity. If you are taking narcotic pain medication (such as Percocet), do NOT drive a car, operate machinery or make important decisions.  DIET: Return to your usual diet.  NOTIFY YOUR SURGEON IF: You have any bleeding that does not stop, any pus draining from your wound, any fever (over 100.4 F) or chills, persistent nausea/vomiting, persistent diarrhea, or if your pain is not controlled on your discharge pain medications.  FOLLOW-UP:  1. Follow-up with Dr. Chacon 1-2 weeks of discharge.  Please call office for appointment  2. Please follow up with your primary care physician in one week regarding your hospitalization.

## 2019-07-02 ENCOUNTER — APPOINTMENT (OUTPATIENT)
Dept: SURGERY | Facility: CLINIC | Age: 54
End: 2019-07-02
Payer: COMMERCIAL

## 2019-07-02 VITALS
SYSTOLIC BLOOD PRESSURE: 145 MMHG | HEART RATE: 85 BPM | OXYGEN SATURATION: 97 % | BODY MASS INDEX: 29.51 KG/M2 | RESPIRATION RATE: 17 BRPM | DIASTOLIC BLOOD PRESSURE: 90 MMHG | HEIGHT: 67 IN | WEIGHT: 188 LBS | TEMPERATURE: 98.6 F

## 2019-07-02 PROCEDURE — 99024 POSTOP FOLLOW-UP VISIT: CPT

## 2019-07-02 RX ORDER — CHROMIUM 200 MCG
TABLET ORAL
Refills: 0 | Status: DISCONTINUED | COMMUNITY
End: 2019-07-02

## 2019-07-02 NOTE — ASSESSMENT
[FreeTextEntry1] : Wound care was discussed with the patient I told the patient she is cleared to go on a trip to [place].  Activity was discussed with the patient.

## 2019-07-02 NOTE — HISTORY OF PRESENT ILLNESS
[de-identified] : The patient is 4 days s/p incisional hernia repair. She is traveling this weekend and would like to be seen before she flies. Patient has no complaints. She is tolerating a regular diet and having normal bowel function

## 2019-07-02 NOTE — PHYSICAL EXAM
[Abdominal Masses] : No abdominal masses [Abdomen Tenderness] : ~T ~M No abdominal tenderness [No HSM] : no hepatosplenomegaly [de-identified] : The incisional hernia repairs intact. The surgical wound is clean dry and intact. 1 cc of old hematoma was aspirated from the wound.

## 2019-07-09 LAB — SURGICAL PATHOLOGY STUDY: SIGNIFICANT CHANGE UP

## 2021-03-30 ENCOUNTER — APPOINTMENT (OUTPATIENT)
Dept: SURGERY | Facility: CLINIC | Age: 56
End: 2021-03-30
Payer: COMMERCIAL

## 2021-03-30 VITALS
HEART RATE: 94 BPM | DIASTOLIC BLOOD PRESSURE: 94 MMHG | OXYGEN SATURATION: 96 % | RESPIRATION RATE: 16 BRPM | SYSTOLIC BLOOD PRESSURE: 152 MMHG | TEMPERATURE: 98 F

## 2021-03-30 DIAGNOSIS — Z22.322 CARRIER OR SUSPECTED CARRIER OF METHICILLIN RESISTANT STAPHYLOCOCCUS AUREUS: ICD-10-CM

## 2021-03-30 DIAGNOSIS — K82.8 OTHER SPECIFIED DISEASES OF GALLBLADDER: ICD-10-CM

## 2021-03-30 DIAGNOSIS — K43.2 INCISIONAL HERNIA W/OUT OBSTRUCTION OR GANGRENE: ICD-10-CM

## 2021-03-30 PROCEDURE — 99215 OFFICE O/P EST HI 40 MIN: CPT

## 2021-03-30 PROCEDURE — 99072 ADDL SUPL MATRL&STAF TM PHE: CPT

## 2021-03-30 RX ORDER — DIETHYLPROPION HYDROCHLORIDE 25 MG/1
25 TABLET ORAL
Refills: 0 | Status: DISCONTINUED | COMMUNITY
End: 2021-03-30

## 2021-03-30 NOTE — HISTORY OF PRESENT ILLNESS
[de-identified] : The patient is a 55 year old female here for evaluation of incisional discomfort. S/P incisional hernia (through a Laparoscopic port) repair with a 1.7 PVP patch on 6/28/19.  She has noticed a small lump above the previous repair.  Is been no nausea no vomiting no change in bowel habits.  She occasionally has some discomfort in the area.  The patient states that the lump goes away when she lies down.

## 2021-03-30 NOTE — ASSESSMENT
[FreeTextEntry1] : I discussed with the patient the repair of this recurrent incisional hernia which is in an area of a mild diastases.  I have suggested that the diastases be repaired and have given her a card to see a plastic surgeon about doing the diastases repair.

## 2021-03-30 NOTE — CONSULT LETTER
[Dear  ___] : Dear  [unfilled], [Consult Letter:] : I had the pleasure of evaluating your patient, [unfilled]. [Please see my note below.] : Please see my note below. [Consult Closing:] : Thank you very much for allowing me to participate in the care of this patient.  If you have any questions, please do not hesitate to contact me. [Sincerely,] : Sincerely, [FreeTextEntry3] : I have reviewed all the documentation for this encounter with the patient and have edited where appropriate\par \par Dr. Chidi Chaves

## 2021-03-30 NOTE — PHYSICAL EXAM
[Normal Breath Sounds] : Normal breath sounds [Normal Heart Sounds] : normal heart sounds [No HSM] : no hepatosplenomegaly [Alert] : alert [Oriented to Person] : oriented to person [Oriented to Place] : oriented to place [Oriented to Time] : oriented to time [Calm] : calm [Abdominal Masses] : No abdominal masses [Abdomen Tenderness] : ~T ~M No abdominal tenderness [de-identified] : Developed well-nourished white female in no acute distress [de-identified] : wnl -cranial nerves II through XII intact [de-identified] : There is an incisional hernia above the previous PVP patch repair in the area of a diastases.

## 2021-04-01 LAB
MRSA SPEC QL CULT: NOT DETECTED
STAPH AUREUS (SA): NOT DETECTED

## 2021-08-16 ENCOUNTER — OUTPATIENT (OUTPATIENT)
Dept: OUTPATIENT SERVICES | Facility: HOSPITAL | Age: 56
LOS: 1 days | End: 2021-08-16
Payer: COMMERCIAL

## 2021-08-16 VITALS
SYSTOLIC BLOOD PRESSURE: 143 MMHG | HEART RATE: 75 BPM | WEIGHT: 186.95 LBS | RESPIRATION RATE: 14 BRPM | HEIGHT: 67 IN | DIASTOLIC BLOOD PRESSURE: 89 MMHG | OXYGEN SATURATION: 96 % | TEMPERATURE: 98 F

## 2021-08-16 DIAGNOSIS — Z98.890 OTHER SPECIFIED POSTPROCEDURAL STATES: Chronic | ICD-10-CM

## 2021-08-16 DIAGNOSIS — K43.2 INCISIONAL HERNIA WITHOUT OBSTRUCTION OR GANGRENE: ICD-10-CM

## 2021-08-16 DIAGNOSIS — Z01.818 ENCOUNTER FOR OTHER PREPROCEDURAL EXAMINATION: ICD-10-CM

## 2021-08-16 DIAGNOSIS — Z90.49 ACQUIRED ABSENCE OF OTHER SPECIFIED PARTS OF DIGESTIVE TRACT: Chronic | ICD-10-CM

## 2021-08-16 DIAGNOSIS — S31.109A UNSPECIFIED OPEN WOUND OF ABDOMINAL WALL, UNSPECIFIED QUADRANT WITHOUT PENETRATION INTO PERITONEAL CAVITY, INITIAL ENCOUNTER: ICD-10-CM

## 2021-08-16 LAB
ANION GAP SERPL CALC-SCNC: 15 MMOL/L — SIGNIFICANT CHANGE UP (ref 5–17)
BLD GP AB SCN SERPL QL: NEGATIVE — SIGNIFICANT CHANGE UP
BUN SERPL-MCNC: 11 MG/DL — SIGNIFICANT CHANGE UP (ref 7–23)
CALCIUM SERPL-MCNC: 9.9 MG/DL — SIGNIFICANT CHANGE UP (ref 8.4–10.5)
CHLORIDE SERPL-SCNC: 102 MMOL/L — SIGNIFICANT CHANGE UP (ref 96–108)
CO2 SERPL-SCNC: 24 MMOL/L — SIGNIFICANT CHANGE UP (ref 22–31)
CREAT SERPL-MCNC: 0.6 MG/DL — SIGNIFICANT CHANGE UP (ref 0.5–1.3)
GLUCOSE SERPL-MCNC: 98 MG/DL — SIGNIFICANT CHANGE UP (ref 70–99)
HCT VFR BLD CALC: 42.9 % — SIGNIFICANT CHANGE UP (ref 34.5–45)
HGB BLD-MCNC: 13.9 G/DL — SIGNIFICANT CHANGE UP (ref 11.5–15.5)
MCHC RBC-ENTMCNC: 30.8 PG — SIGNIFICANT CHANGE UP (ref 27–34)
MCHC RBC-ENTMCNC: 32.4 GM/DL — SIGNIFICANT CHANGE UP (ref 32–36)
MCV RBC AUTO: 94.9 FL — SIGNIFICANT CHANGE UP (ref 80–100)
MRSA PCR RESULT.: SIGNIFICANT CHANGE UP
NRBC # BLD: 0 /100 WBCS — SIGNIFICANT CHANGE UP (ref 0–0)
PLATELET # BLD AUTO: 246 K/UL — SIGNIFICANT CHANGE UP (ref 150–400)
POTASSIUM SERPL-MCNC: 5 MMOL/L — SIGNIFICANT CHANGE UP (ref 3.5–5.3)
POTASSIUM SERPL-SCNC: 5 MMOL/L — SIGNIFICANT CHANGE UP (ref 3.5–5.3)
RBC # BLD: 4.52 M/UL — SIGNIFICANT CHANGE UP (ref 3.8–5.2)
RBC # FLD: 12.7 % — SIGNIFICANT CHANGE UP (ref 10.3–14.5)
RH IG SCN BLD-IMP: POSITIVE — SIGNIFICANT CHANGE UP
S AUREUS DNA NOSE QL NAA+PROBE: SIGNIFICANT CHANGE UP
SODIUM SERPL-SCNC: 141 MMOL/L — SIGNIFICANT CHANGE UP (ref 135–145)
WBC # BLD: 7.94 K/UL — SIGNIFICANT CHANGE UP (ref 3.8–10.5)
WBC # FLD AUTO: 7.94 K/UL — SIGNIFICANT CHANGE UP (ref 3.8–10.5)

## 2021-08-16 PROCEDURE — 86850 RBC ANTIBODY SCREEN: CPT

## 2021-08-16 PROCEDURE — 87641 MR-STAPH DNA AMP PROBE: CPT

## 2021-08-16 PROCEDURE — 86900 BLOOD TYPING SEROLOGIC ABO: CPT

## 2021-08-16 PROCEDURE — 80048 BASIC METABOLIC PNL TOTAL CA: CPT

## 2021-08-16 PROCEDURE — 85027 COMPLETE CBC AUTOMATED: CPT

## 2021-08-16 PROCEDURE — G0463: CPT

## 2021-08-16 PROCEDURE — 87640 STAPH A DNA AMP PROBE: CPT

## 2021-08-16 PROCEDURE — 86901 BLOOD TYPING SEROLOGIC RH(D): CPT

## 2021-08-16 RX ORDER — AMLODIPINE BESYLATE 2.5 MG/1
1 TABLET ORAL
Qty: 0 | Refills: 0 | DISCHARGE

## 2021-08-16 RX ORDER — ARIPIPRAZOLE 15 MG/1
1 TABLET ORAL
Qty: 0 | Refills: 0 | DISCHARGE

## 2021-08-16 RX ORDER — SERTRALINE 25 MG/1
1 TABLET, FILM COATED ORAL
Qty: 0 | Refills: 0 | DISCHARGE

## 2021-08-16 RX ORDER — CLONAZEPAM 1 MG
1 TABLET ORAL
Qty: 0 | Refills: 0 | DISCHARGE

## 2021-08-16 NOTE — H&P PST ADULT - NSICDXPASTMEDICALHX_GEN_ALL_CORE_FT
PAST MEDICAL HISTORY:  Anxiety     Calculus of gallbladder without cholecystitis     HTN (hypertension)     Smoker      PAST MEDICAL HISTORY:  2019 novel coronavirus disease (COVID-19) diagnosed 3/2012, no hospitalization required    Anxiety     Calculus of gallbladder without cholecystitis     HTN (hypertension)     Smoker

## 2021-08-16 NOTE — H&P PST ADULT - LAST STRESS TEST
denies 2018 WNL per pt "I started to jog at the time and I felt short of breath when I was running.  I wanted to make sure everything was ok.  They said the test was good.  Nothing is wrong."

## 2021-08-16 NOTE — H&P PST ADULT - HISTORY OF PRESENT ILLNESS
53 y/o F PMH mild DECLAN, no treatment, open cholecystectomy 10/2018 now c/b incisional hernia that occurred 2 months ago.  Presents today for incisional hernia repair.    The patient is a 55 year old female here for evaluation of incisional discomfort. S/P incisional hernia (through a Laparoscopic port) repair with a 1.7 PVP patch on 6/28/19. She has noticed a small lump above the previous repair. Is been no nausea no vomiting no change in bowel habits. She occasionally has some discomfort in the area. The patient states that the lump goes away when she lies down.     Active Problems  Anxiety (300.00) (F41.9)   55 year old female PMH covid-19 in 3/2021 (did not required hospitalization), anxiety (on rexult and sertraline, denies ever been hospitalized for mood disorder), s/p cholecystectomy 2018, incisional hernia repair with mesh 2019. c/o small lump above the previous repair. Is been no nausea no vomiting no change in bowel habits. She occasionally has some discomfort in the area. The patient states that the lump goes away when she lies down. evaluated by DR. Chaves, diagnosed with rectus diastasis and incisional hernia on 8/30.  covid test scheduled on 8/27 at [facility].

## 2021-08-16 NOTE — H&P PST ADULT - NSANTHOSAYNRD_GEN_A_CORE
mild/Yes No. DECLAN screening performed.  STOP BANG Legend: 0-2 = LOW Risk; 3-4 = INTERMEDIATE Risk; 5-8 = HIGH Risk

## 2021-08-16 NOTE — H&P PST ADULT - ATTENDING COMMENTS
Patient seen this morning in preparation for surgery. Smoking history reviewed. She reports that her last cigarette was yesterday and that she has been continuing to smoke about 7 or 8 cigarettes per day. Wound healing risks related to smoking reviewed. Patient say that she has been able to quit smoking in the past and can quit in preparation for surgery. Will reschedule surgery for later in the fall and will get cotinine level prior to surgery.

## 2021-08-16 NOTE — H&P PST ADULT - OTHER CARE PROVIDERS
sees psychiatrist every 3 months, sees  every 3 months sees psychiatrist (Dr. ARTIE Stringer)  every 3 months, sees  every 3 months

## 2021-08-16 NOTE — H&P PST ADULT - NEGATIVE GASTROINTESTINAL SYMPTOMS
"Speech Language Therapy Evaluation completed to address cognition  Functional Status:  Pt presenting with mild to moderate deficits in attention, short term memory and thought organization. Amnestic to event. Pt reports not remembering his girlfriends name this am. He says the year is 2018 but that the month is April. He reports difficulty focusing on the television show he is watching and that he feels 60% of normal cognitive function. He had difficulty with moderately complex written directives. MRI pending. Educ pt on MTBI.  Recommendations:  Post acute SLP for cognitive tx.  Will follow during acute stay.   Plan of Care: Will benefit from Speech Therapy 3 times per week  Post-Acute Therapy: Discharge to home with outpatient or home health for additional skilled therapy services.    See \"Rehab Therapy-Acute\" Patient Summary Report for complete documentation.   "
no nausea/no vomiting/no diarrhea/no constipation/no change in bowel habits

## 2021-08-17 ENCOUNTER — TRANSCRIPTION ENCOUNTER (OUTPATIENT)
Age: 56
End: 2021-08-17

## 2021-08-27 ENCOUNTER — OUTPATIENT (OUTPATIENT)
Dept: OUTPATIENT SERVICES | Facility: HOSPITAL | Age: 56
LOS: 1 days | End: 2021-08-27
Payer: COMMERCIAL

## 2021-08-27 DIAGNOSIS — Z11.52 ENCOUNTER FOR SCREENING FOR COVID-19: ICD-10-CM

## 2021-08-27 DIAGNOSIS — Z98.890 OTHER SPECIFIED POSTPROCEDURAL STATES: Chronic | ICD-10-CM

## 2021-08-27 DIAGNOSIS — Z90.49 ACQUIRED ABSENCE OF OTHER SPECIFIED PARTS OF DIGESTIVE TRACT: Chronic | ICD-10-CM

## 2021-08-27 LAB — SARS-COV-2 RNA SPEC QL NAA+PROBE: SIGNIFICANT CHANGE UP

## 2021-08-27 PROCEDURE — U0005: CPT

## 2021-08-27 PROCEDURE — U0003: CPT

## 2021-08-27 PROCEDURE — C9803: CPT

## 2021-08-30 ENCOUNTER — OUTPATIENT (OUTPATIENT)
Dept: OUTPATIENT SERVICES | Facility: HOSPITAL | Age: 56
LOS: 1 days | End: 2021-08-30

## 2021-08-30 VITALS
TEMPERATURE: 99 F | WEIGHT: 186.95 LBS | HEART RATE: 77 BPM | SYSTOLIC BLOOD PRESSURE: 128 MMHG | OXYGEN SATURATION: 95 % | HEIGHT: 67 IN | RESPIRATION RATE: 18 BRPM | DIASTOLIC BLOOD PRESSURE: 80 MMHG

## 2021-08-30 DIAGNOSIS — S31.109A UNSPECIFIED OPEN WOUND OF ABDOMINAL WALL, UNSPECIFIED QUADRANT WITHOUT PENETRATION INTO PERITONEAL CAVITY, INITIAL ENCOUNTER: ICD-10-CM

## 2021-08-30 DIAGNOSIS — Z98.890 OTHER SPECIFIED POSTPROCEDURAL STATES: Chronic | ICD-10-CM

## 2021-08-30 DIAGNOSIS — Z01.818 ENCOUNTER FOR OTHER PREPROCEDURAL EXAMINATION: ICD-10-CM

## 2021-08-30 DIAGNOSIS — Z90.49 ACQUIRED ABSENCE OF OTHER SPECIFIED PARTS OF DIGESTIVE TRACT: Chronic | ICD-10-CM

## 2021-08-30 DIAGNOSIS — K43.2 INCISIONAL HERNIA WITHOUT OBSTRUCTION OR GANGRENE: ICD-10-CM

## 2021-08-30 RX ORDER — CHLORHEXIDINE GLUCONATE 213 G/1000ML
1 SOLUTION TOPICAL ONCE
Refills: 0 | Status: DISCONTINUED | OUTPATIENT
Start: 2021-08-30 | End: 2021-08-30

## 2021-08-30 RX ORDER — LIDOCAINE HCL 20 MG/ML
0.2 VIAL (ML) INJECTION ONCE
Refills: 0 | Status: DISCONTINUED | OUTPATIENT
Start: 2021-08-30 | End: 2021-08-30

## 2021-08-30 RX ORDER — SODIUM CHLORIDE 9 MG/ML
3 INJECTION INTRAMUSCULAR; INTRAVENOUS; SUBCUTANEOUS EVERY 8 HOURS
Refills: 0 | Status: DISCONTINUED | OUTPATIENT
Start: 2021-08-30 | End: 2021-08-30

## 2021-08-30 NOTE — ASU PATIENT PROFILE, ADULT - NSICDXPASTSURGICALHX_GEN_ALL_CORE_FT
PAST SURGICAL HISTORY:  H/O rhinoplasty ~10 yrs ago    History of incisional hernia repair mesh 2019    S/P cholecystectomy

## 2021-08-30 NOTE — ASU PATIENT PROFILE, ADULT - TOBACCO USE
Never smoker [FreeTextEntry1] : cc: physical, joint pain  [de-identified] : Patient came  for physical. She denies CP,SOB,Abd pain, no N,V,C,D. Patient c/o joint pain  mostly ava on and off, + deformity, + edema on and off, waiting for Rheumatol eval.  \par

## 2021-08-30 NOTE — ASU PATIENT PROFILE, ADULT - NSICDXPASTMEDICALHX_GEN_ALL_CORE_FT
PAST MEDICAL HISTORY:  2019 novel coronavirus disease (COVID-19) diagnosed 3/2012, no hospitalization required    Anxiety     Calculus of gallbladder without cholecystitis     HTN (hypertension)     Smoker

## 2021-09-21 NOTE — H&P PST ADULT - PUPIL SIZE L
Pt's mother comes to nurse's station and states \"I am just going to take her to my sister's house and we'll go (to [facility]) in the morning. \" Mother informed that transport was on the way and that she would have to go separate. Mother returned to room. Several minutes later came out with pt and began walking towards ER door. Mother informed she could not leave with pt.  made aware and mother is stopped at front door. Pt is returned to room with door left open and officer outside.      Odalis Jackson RN  09/21/21 6120 mm/2

## 2021-09-27 ENCOUNTER — OUTPATIENT (OUTPATIENT)
Dept: OUTPATIENT SERVICES | Facility: HOSPITAL | Age: 56
LOS: 1 days | End: 2021-09-27
Payer: COMMERCIAL

## 2021-09-27 VITALS
TEMPERATURE: 99 F | WEIGHT: 182.1 LBS | DIASTOLIC BLOOD PRESSURE: 85 MMHG | OXYGEN SATURATION: 96 % | SYSTOLIC BLOOD PRESSURE: 150 MMHG | HEART RATE: 74 BPM | RESPIRATION RATE: 16 BRPM | HEIGHT: 67 IN

## 2021-09-27 DIAGNOSIS — Z98.890 OTHER SPECIFIED POSTPROCEDURAL STATES: Chronic | ICD-10-CM

## 2021-09-27 DIAGNOSIS — K43.2 INCISIONAL HERNIA WITHOUT OBSTRUCTION OR GANGRENE: ICD-10-CM

## 2021-09-27 DIAGNOSIS — Z90.49 ACQUIRED ABSENCE OF OTHER SPECIFIED PARTS OF DIGESTIVE TRACT: Chronic | ICD-10-CM

## 2021-09-27 DIAGNOSIS — Z01.818 ENCOUNTER FOR OTHER PREPROCEDURAL EXAMINATION: ICD-10-CM

## 2021-09-27 LAB
A1C WITH ESTIMATED AVERAGE GLUCOSE RESULT: 5.3 % — SIGNIFICANT CHANGE UP (ref 4–5.6)
ANION GAP SERPL CALC-SCNC: 17 MMOL/L — SIGNIFICANT CHANGE UP (ref 5–17)
BLD GP AB SCN SERPL QL: NEGATIVE — SIGNIFICANT CHANGE UP
BUN SERPL-MCNC: 10 MG/DL — SIGNIFICANT CHANGE UP (ref 7–23)
CALCIUM SERPL-MCNC: 10 MG/DL — SIGNIFICANT CHANGE UP (ref 8.4–10.5)
CHLORIDE SERPL-SCNC: 98 MMOL/L — SIGNIFICANT CHANGE UP (ref 96–108)
CO2 SERPL-SCNC: 22 MMOL/L — SIGNIFICANT CHANGE UP (ref 22–31)
CREAT SERPL-MCNC: 0.6 MG/DL — SIGNIFICANT CHANGE UP (ref 0.5–1.3)
ESTIMATED AVERAGE GLUCOSE: 105 MG/DL — SIGNIFICANT CHANGE UP (ref 68–114)
GLUCOSE SERPL-MCNC: 89 MG/DL — SIGNIFICANT CHANGE UP (ref 70–99)
HCT VFR BLD CALC: 43.8 % — SIGNIFICANT CHANGE UP (ref 34.5–45)
HGB BLD-MCNC: 14.4 G/DL — SIGNIFICANT CHANGE UP (ref 11.5–15.5)
MCHC RBC-ENTMCNC: 30.1 PG — SIGNIFICANT CHANGE UP (ref 27–34)
MCHC RBC-ENTMCNC: 32.9 GM/DL — SIGNIFICANT CHANGE UP (ref 32–36)
MCV RBC AUTO: 91.4 FL — SIGNIFICANT CHANGE UP (ref 80–100)
NRBC # BLD: 0 /100 WBCS — SIGNIFICANT CHANGE UP (ref 0–0)
PLATELET # BLD AUTO: 251 K/UL — SIGNIFICANT CHANGE UP (ref 150–400)
POTASSIUM SERPL-MCNC: 4.1 MMOL/L — SIGNIFICANT CHANGE UP (ref 3.5–5.3)
POTASSIUM SERPL-SCNC: 4.1 MMOL/L — SIGNIFICANT CHANGE UP (ref 3.5–5.3)
RBC # BLD: 4.79 M/UL — SIGNIFICANT CHANGE UP (ref 3.8–5.2)
RBC # FLD: 12.2 % — SIGNIFICANT CHANGE UP (ref 10.3–14.5)
RH IG SCN BLD-IMP: POSITIVE — SIGNIFICANT CHANGE UP
SODIUM SERPL-SCNC: 137 MMOL/L — SIGNIFICANT CHANGE UP (ref 135–145)
WBC # BLD: 8.6 K/UL — SIGNIFICANT CHANGE UP (ref 3.8–10.5)
WBC # FLD AUTO: 8.6 K/UL — SIGNIFICANT CHANGE UP (ref 3.8–10.5)

## 2021-09-27 PROCEDURE — 87640 STAPH A DNA AMP PROBE: CPT

## 2021-09-27 PROCEDURE — 87641 MR-STAPH DNA AMP PROBE: CPT

## 2021-09-27 PROCEDURE — 86900 BLOOD TYPING SEROLOGIC ABO: CPT

## 2021-09-27 PROCEDURE — 86901 BLOOD TYPING SEROLOGIC RH(D): CPT

## 2021-09-27 PROCEDURE — 86850 RBC ANTIBODY SCREEN: CPT

## 2021-09-27 PROCEDURE — G0480: CPT

## 2021-09-27 PROCEDURE — 83036 HEMOGLOBIN GLYCOSYLATED A1C: CPT

## 2021-09-27 PROCEDURE — G0463: CPT

## 2021-09-27 PROCEDURE — 80048 BASIC METABOLIC PNL TOTAL CA: CPT

## 2021-09-27 PROCEDURE — 85027 COMPLETE CBC AUTOMATED: CPT

## 2021-09-27 RX ORDER — CHLORHEXIDINE GLUCONATE 213 G/1000ML
1 SOLUTION TOPICAL ONCE
Refills: 0 | Status: DISCONTINUED | OUTPATIENT
Start: 2021-10-11 | End: 2021-10-25

## 2021-09-27 RX ORDER — VANCOMYCIN HCL 1 G
1250 VIAL (EA) INTRAVENOUS ONCE
Refills: 0 | Status: DISCONTINUED | OUTPATIENT
Start: 2021-10-11 | End: 2021-10-25

## 2021-09-27 NOTE — H&P PST ADULT - HISTORY OF PRESENT ILLNESS
56 year old female PMH of Covid-19 (in march 2020)  (mild symptoms, no hospitalization), Anxiety (on Rexulti and sertraline), s/p cholecystectomy 2018, Incisional Hernia repair with mesh 2019. c/o small lump above the previous repair. Denies nausea/ vomiting,  change in bowel habits. She reports some occasional discomfort in the area of the hernia.  She presents to PST today for evaluation prior to scheduled Incisional hernia Repair and Repair of Diastasis Recti on 10/11/2021.    Of note patient states that her last tobacco use was 8/30/2021    preop covid screen 10/8 @ [facility].        56 year old female PMH of Covid-19 infection ( march 2020, mild symptoms, no hospitalization), Anxiety, s/p Cholecystectomy 2018, Incisional Hernia repair with mesh 2019, with complaint of  small lump above the previous repair. She denies nausea/ vomiting,  change in bowel habits. She reports some occasional discomfort in the area of the hernia.  She presents to PST today for evaluation prior to scheduled Incisional hernia Repair and Repair of Diastasis Recti on 10/11/2021.    ****Of note patient states that her last tobacco use was 8/30/2021    preop covid screen 10/8 @ [facility].

## 2021-09-27 NOTE — H&P PST ADULT - NS HIV RISK FACTOR
ok    ----- Message from Ridge Mcclendon MD sent at 3/5/2020  6:11 PM CST -----  [facility] message sent to patient.   Watch for response.    
No

## 2021-09-27 NOTE — H&P PST ADULT - PROBLEM SELECTOR PLAN 1
Incisional Hernia Repair, Repair of Diastasis Recti  -cbc, bmp, type and screen, A1c, serum nicotine metabolite, mrsa/mssa swab done at [facility]  -medical evaluation  -preop instructions  -ABO DOS  -fingerstick on admit

## 2021-09-27 NOTE — H&P PST ADULT - NSICDXPASTMEDICALHX_GEN_ALL_CORE_FT
PAST MEDICAL HISTORY:  2019 novel coronavirus disease (COVID-19) tested postive march 2020, denies hospitalization    Anxiety     Calculus of gallbladder without cholecystitis     HTN (hypertension)     Smoker last tobacco use august 30th, 2021

## 2021-09-27 NOTE — H&P PST ADULT - NEGATIVE GASTROINTESTINAL SYMPTOMS
no nausea/no vomiting/no diarrhea/no constipation/no change in bowel habits/no melena/no hematochezia

## 2021-09-27 NOTE — H&P PST ADULT - NSICDXPASTSURGICALHX_GEN_ALL_CORE_FT
PAST SURGICAL HISTORY:  H/O rhinoplasty ~10 yrs ago    History of incisional hernia repair mesh 2019    S/P cholecystectomy     S/P colonoscopy

## 2021-09-28 LAB
MRSA PCR RESULT.: SIGNIFICANT CHANGE UP
S AUREUS DNA NOSE QL NAA+PROBE: SIGNIFICANT CHANGE UP

## 2021-10-05 PROBLEM — U07.1 COVID-19: Chronic | Status: ACTIVE | Noted: 2021-08-16

## 2021-10-05 PROBLEM — F17.200 NICOTINE DEPENDENCE, UNSPECIFIED, UNCOMPLICATED: Chronic | Status: ACTIVE | Noted: 2018-09-19

## 2021-10-06 LAB
COTINE: <1 NG/ML — SIGNIFICANT CHANGE UP
COTININE SERPL-MCNC: <1 NG/ML — SIGNIFICANT CHANGE UP

## 2021-10-08 ENCOUNTER — OUTPATIENT (OUTPATIENT)
Dept: OUTPATIENT SERVICES | Facility: HOSPITAL | Age: 56
LOS: 1 days | End: 2021-10-08
Payer: COMMERCIAL

## 2021-10-08 DIAGNOSIS — Z98.890 OTHER SPECIFIED POSTPROCEDURAL STATES: Chronic | ICD-10-CM

## 2021-10-08 DIAGNOSIS — Z11.52 ENCOUNTER FOR SCREENING FOR COVID-19: ICD-10-CM

## 2021-10-08 DIAGNOSIS — Z90.49 ACQUIRED ABSENCE OF OTHER SPECIFIED PARTS OF DIGESTIVE TRACT: Chronic | ICD-10-CM

## 2021-10-08 LAB — SARS-COV-2 RNA SPEC QL NAA+PROBE: SIGNIFICANT CHANGE UP

## 2021-10-08 PROCEDURE — U0005: CPT

## 2021-10-08 PROCEDURE — C9803: CPT

## 2021-10-08 PROCEDURE — U0003: CPT

## 2021-10-10 ENCOUNTER — TRANSCRIPTION ENCOUNTER (OUTPATIENT)
Age: 56
End: 2021-10-10

## 2021-10-11 ENCOUNTER — APPOINTMENT (OUTPATIENT)
Dept: SURGERY | Facility: HOSPITAL | Age: 56
End: 2021-10-11
Payer: COMMERCIAL

## 2021-10-11 ENCOUNTER — OUTPATIENT (OUTPATIENT)
Dept: OUTPATIENT SERVICES | Facility: HOSPITAL | Age: 56
LOS: 1 days | End: 2021-10-11
Payer: COMMERCIAL

## 2021-10-11 VITALS
RESPIRATION RATE: 16 BRPM | SYSTOLIC BLOOD PRESSURE: 121 MMHG | OXYGEN SATURATION: 96 % | HEART RATE: 73 BPM | HEIGHT: 67 IN | TEMPERATURE: 98 F | WEIGHT: 182.1 LBS | DIASTOLIC BLOOD PRESSURE: 78 MMHG

## 2021-10-11 DIAGNOSIS — Z90.49 ACQUIRED ABSENCE OF OTHER SPECIFIED PARTS OF DIGESTIVE TRACT: Chronic | ICD-10-CM

## 2021-10-11 DIAGNOSIS — Z98.890 OTHER SPECIFIED POSTPROCEDURAL STATES: Chronic | ICD-10-CM

## 2021-10-11 DIAGNOSIS — K43.2 INCISIONAL HERNIA WITHOUT OBSTRUCTION OR GANGRENE: ICD-10-CM

## 2021-10-11 LAB — GLUCOSE BLDC GLUCOMTR-MCNC: 108 MG/DL — HIGH (ref 70–99)

## 2021-10-11 PROCEDURE — 49568: CPT

## 2021-10-11 PROCEDURE — 49560: CPT

## 2021-10-11 RX ORDER — BREXPIPRAZOLE 0.25 MG/1
1 TABLET ORAL
Qty: 0 | Refills: 0 | DISCHARGE

## 2021-10-11 RX ORDER — HYDROMORPHONE HYDROCHLORIDE 2 MG/ML
0.5 INJECTION INTRAMUSCULAR; INTRAVENOUS; SUBCUTANEOUS
Refills: 0 | Status: DISCONTINUED | OUTPATIENT
Start: 2021-10-11 | End: 2021-10-12

## 2021-10-11 RX ORDER — TRAZODONE HCL 50 MG
1 TABLET ORAL
Qty: 0 | Refills: 0 | DISCHARGE

## 2021-10-11 RX ORDER — OXYCODONE HYDROCHLORIDE 5 MG/1
10 TABLET ORAL EVERY 6 HOURS
Refills: 0 | Status: DISCONTINUED | OUTPATIENT
Start: 2021-10-11 | End: 2021-10-12

## 2021-10-11 RX ORDER — SERTRALINE 25 MG/1
100 TABLET, FILM COATED ORAL DAILY
Refills: 0 | Status: DISCONTINUED | OUTPATIENT
Start: 2021-10-11 | End: 2021-10-11

## 2021-10-11 RX ORDER — AMLODIPINE BESYLATE 2.5 MG/1
5 TABLET ORAL AT BEDTIME
Refills: 0 | Status: DISCONTINUED | OUTPATIENT
Start: 2021-10-11 | End: 2021-10-25

## 2021-10-11 RX ORDER — AMLODIPINE BESYLATE 2.5 MG/1
1 TABLET ORAL
Qty: 0 | Refills: 0 | DISCHARGE

## 2021-10-11 RX ORDER — TELMISARTAN 20 MG/1
1 TABLET ORAL
Qty: 0 | Refills: 0 | DISCHARGE

## 2021-10-11 RX ORDER — OXYCODONE HYDROCHLORIDE 5 MG/1
5 TABLET ORAL EVERY 6 HOURS
Refills: 0 | Status: DISCONTINUED | OUTPATIENT
Start: 2021-10-11 | End: 2021-10-11

## 2021-10-11 RX ORDER — SODIUM CHLORIDE 9 MG/ML
1000 INJECTION, SOLUTION INTRAVENOUS
Refills: 0 | Status: DISCONTINUED | OUTPATIENT
Start: 2021-10-11 | End: 2021-10-12

## 2021-10-11 RX ORDER — TRAZODONE HCL 50 MG
100 TABLET ORAL AT BEDTIME
Refills: 0 | Status: DISCONTINUED | OUTPATIENT
Start: 2021-10-11 | End: 2021-10-25

## 2021-10-11 RX ORDER — HYDROMORPHONE HYDROCHLORIDE 2 MG/ML
0.5 INJECTION INTRAMUSCULAR; INTRAVENOUS; SUBCUTANEOUS ONCE
Refills: 0 | Status: DISCONTINUED | OUTPATIENT
Start: 2021-10-11 | End: 2021-10-11

## 2021-10-11 RX ORDER — HYDROMORPHONE HYDROCHLORIDE 2 MG/ML
0.25 INJECTION INTRAMUSCULAR; INTRAVENOUS; SUBCUTANEOUS
Refills: 0 | Status: DISCONTINUED | OUTPATIENT
Start: 2021-10-11 | End: 2021-10-11

## 2021-10-11 RX ORDER — ONDANSETRON 8 MG/1
4 TABLET, FILM COATED ORAL ONCE
Refills: 0 | Status: DISCONTINUED | OUTPATIENT
Start: 2021-10-11 | End: 2021-10-12

## 2021-10-11 RX ORDER — BREXPIPRAZOLE 0.25 MG/1
2 TABLET ORAL DAILY
Refills: 0 | Status: DISCONTINUED | OUTPATIENT
Start: 2021-10-11 | End: 2021-10-25

## 2021-10-11 RX ORDER — SERTRALINE 25 MG/1
2 TABLET, FILM COATED ORAL
Qty: 0 | Refills: 0 | DISCHARGE

## 2021-10-11 RX ORDER — SERTRALINE 25 MG/1
200 TABLET, FILM COATED ORAL DAILY
Refills: 0 | Status: DISCONTINUED | OUTPATIENT
Start: 2021-10-11 | End: 2021-10-25

## 2021-10-11 RX ORDER — ACETAMINOPHEN 500 MG
650 TABLET ORAL EVERY 6 HOURS
Refills: 0 | Status: DISCONTINUED | OUTPATIENT
Start: 2021-10-11 | End: 2021-10-25

## 2021-10-11 RX ORDER — LIDOCAINE HCL 20 MG/ML
0.2 VIAL (ML) INJECTION ONCE
Refills: 0 | Status: DISCONTINUED | OUTPATIENT
Start: 2021-10-11 | End: 2021-10-11

## 2021-10-11 RX ORDER — HYDROMORPHONE HYDROCHLORIDE 2 MG/ML
0.5 INJECTION INTRAMUSCULAR; INTRAVENOUS; SUBCUTANEOUS
Refills: 0 | Status: DISCONTINUED | OUTPATIENT
Start: 2021-10-11 | End: 2021-10-11

## 2021-10-11 RX ORDER — HYDROMORPHONE HYDROCHLORIDE 2 MG/ML
0.5 INJECTION INTRAMUSCULAR; INTRAVENOUS; SUBCUTANEOUS EVERY 6 HOURS
Refills: 0 | Status: DISCONTINUED | OUTPATIENT
Start: 2021-10-11 | End: 2021-10-12

## 2021-10-11 RX ORDER — SODIUM CHLORIDE 9 MG/ML
3 INJECTION INTRAMUSCULAR; INTRAVENOUS; SUBCUTANEOUS EVERY 8 HOURS
Refills: 0 | Status: DISCONTINUED | OUTPATIENT
Start: 2021-10-11 | End: 2021-10-11

## 2021-10-11 RX ADMIN — Medication 30 MILLILITER(S): at 20:57

## 2021-10-11 RX ADMIN — HYDROMORPHONE HYDROCHLORIDE 0.5 MILLIGRAM(S): 2 INJECTION INTRAMUSCULAR; INTRAVENOUS; SUBCUTANEOUS at 22:50

## 2021-10-11 RX ADMIN — HYDROMORPHONE HYDROCHLORIDE 0.25 MILLIGRAM(S): 2 INJECTION INTRAMUSCULAR; INTRAVENOUS; SUBCUTANEOUS at 15:00

## 2021-10-11 RX ADMIN — HYDROMORPHONE HYDROCHLORIDE 0.25 MILLIGRAM(S): 2 INJECTION INTRAMUSCULAR; INTRAVENOUS; SUBCUTANEOUS at 11:00

## 2021-10-11 RX ADMIN — Medication 100 MILLIGRAM(S): at 21:56

## 2021-10-11 RX ADMIN — OXYCODONE HYDROCHLORIDE 10 MILLIGRAM(S): 5 TABLET ORAL at 16:00

## 2021-10-11 RX ADMIN — AMLODIPINE BESYLATE 5 MILLIGRAM(S): 2.5 TABLET ORAL at 21:56

## 2021-10-11 RX ADMIN — OXYCODONE HYDROCHLORIDE 10 MILLIGRAM(S): 5 TABLET ORAL at 22:15

## 2021-10-11 RX ADMIN — OXYCODONE HYDROCHLORIDE 10 MILLIGRAM(S): 5 TABLET ORAL at 16:15

## 2021-10-11 RX ADMIN — HYDROMORPHONE HYDROCHLORIDE 0.25 MILLIGRAM(S): 2 INJECTION INTRAMUSCULAR; INTRAVENOUS; SUBCUTANEOUS at 15:15

## 2021-10-11 RX ADMIN — HYDROMORPHONE HYDROCHLORIDE 0.5 MILLIGRAM(S): 2 INJECTION INTRAMUSCULAR; INTRAVENOUS; SUBCUTANEOUS at 17:52

## 2021-10-11 RX ADMIN — Medication 650 MILLIGRAM(S): at 16:00

## 2021-10-11 RX ADMIN — SODIUM CHLORIDE 75 MILLILITER(S): 9 INJECTION, SOLUTION INTRAVENOUS at 10:57

## 2021-10-11 RX ADMIN — HYDROMORPHONE HYDROCHLORIDE 0.5 MILLIGRAM(S): 2 INJECTION INTRAMUSCULAR; INTRAVENOUS; SUBCUTANEOUS at 18:10

## 2021-10-11 RX ADMIN — HYDROMORPHONE HYDROCHLORIDE 0.25 MILLIGRAM(S): 2 INJECTION INTRAMUSCULAR; INTRAVENOUS; SUBCUTANEOUS at 13:38

## 2021-10-11 RX ADMIN — Medication 650 MILLIGRAM(S): at 21:50

## 2021-10-11 RX ADMIN — Medication 650 MILLIGRAM(S): at 16:12

## 2021-10-11 RX ADMIN — Medication 650 MILLIGRAM(S): at 22:15

## 2021-10-11 RX ADMIN — HYDROMORPHONE HYDROCHLORIDE 0.5 MILLIGRAM(S): 2 INJECTION INTRAMUSCULAR; INTRAVENOUS; SUBCUTANEOUS at 23:05

## 2021-10-11 RX ADMIN — OXYCODONE HYDROCHLORIDE 10 MILLIGRAM(S): 5 TABLET ORAL at 21:15

## 2021-10-11 RX ADMIN — HYDROMORPHONE HYDROCHLORIDE 0.25 MILLIGRAM(S): 2 INJECTION INTRAMUSCULAR; INTRAVENOUS; SUBCUTANEOUS at 11:15

## 2021-10-11 RX ADMIN — HYDROMORPHONE HYDROCHLORIDE 0.25 MILLIGRAM(S): 2 INJECTION INTRAMUSCULAR; INTRAVENOUS; SUBCUTANEOUS at 13:55

## 2021-10-11 NOTE — BRIEF OPERATIVE NOTE - NSICDXBRIEFPOSTOP_GEN_ALL_CORE_FT
POST-OP DIAGNOSIS:  Ventral hernia 11-Oct-2021 09:39:22  Freida Hein  Diastasis recti 11-Oct-2021 09:39:37  Freida Hein

## 2021-10-11 NOTE — BRIEF OPERATIVE NOTE - OPERATION/FINDINGS
Flaps raised by Plastics  Small fat-containing ventral hernia, reduced. Retrorectus repair with mesh  Imbrication repair of rectus diastasis by Plastics, see op note

## 2021-10-11 NOTE — BRIEF OPERATIVE NOTE - NSICDXBRIEFPREOP_GEN_ALL_CORE_FT
PRE-OP DIAGNOSIS:  Ventral hernia 11-Oct-2021 09:39:09  Freida Hein  Diastasis recti 11-Oct-2021 09:39:52  Freida Hein

## 2021-10-11 NOTE — PRE-ANESTHESIA EVALUATION ADULT - NSANTHDISPORD_GEN_ALL_CORE
He can try OTC colace, prunes and metamucil. Walking. Adeq water intake also important. If abd pain/N/V - go to IC.  Otherwise schedule OV with Dr. Alma Barber PACU

## 2021-10-11 NOTE — CHART NOTE - NSCHARTNOTEFT_GEN_A_CORE
POST-OPERATIVE NOTE    Subjective:  Patient is s/p ventral inguinal hernia repair with abdominal flap creation. Patient denies of acute onset abdominal pain, fevers, chills, SOB, CP, lightheadedness. Patient is resting comfortably in bed. Recovering appropriately.     Vital Signs Last 24 Hrs  T(C): 36.4 (11 Oct 2021 18:00), Max: 36.9 (11 Oct 2021 06:19)  T(F): 97.5 (11 Oct 2021 18:00), Max: 98.4 (11 Oct 2021 06:19)  HR: 80 (11 Oct 2021 18:15) (65 - 82)  BP: 111/66 (11 Oct 2021 18:15) (103/61 - 139/71)  BP(mean): 98 (11 Oct 2021 16:15) (72 - 98)  RR: 16 (11 Oct 2021 18:15) (15 - 17)  SpO2: 97% (11 Oct 2021 18:15) (96% - 98%)  I&O's Detail    11 Oct 2021 07:01  -  11 Oct 2021 18:45  --------------------------------------------------------  IN:    Lactated Ringers: 990 mL    Oral Fluid: 720 mL  Total IN: 1710 mL    OUT:    Bulb (mL): 40 mL    Bulb (mL): 10 mL    Voided (mL): 600 mL  Total OUT: 650 mL    Total NET: 1060 mL        vancomycin  IVPB 1250  amLODIPine   Tablet 5  vancomycin  IVPB 1250    PAST MEDICAL & SURGICAL HISTORY:  Anxiety    HTN (hypertension)    Smoker  last tobacco use august 30th, 2021    Calculus of gallbladder without cholecystitis    2019 novel coronavirus disease (COVID-19)  tested postive march 2020, denies hospitalization    H/O rhinoplasty  ~10 yrs ago    S/P cholecystectomy    History of incisional hernia repair  mesh 2019    S/P colonoscopy          Physical Exam:  General: NAD, resting comfortably in bed  Pulmonary: Nonlabored breathing, no respiratory distress  Cardiovascular: NSR  Abdominal: soft, NT/ND, abdominal incision site c/d/i,two midline DEREK drains intact and draining serosanginous fluids  Extremities: WWP      LABS:            CAPILLARY BLOOD GLUCOSE      POCT Blood Glucose.: 108 mg/dL (11 Oct 2021 06:13)      Radiology and Additional Studies:    Assessment:  The patient is a 56y Female who is now several hours post-op from a ventral hernia repair with abdominal flap repair.    Plan:  - Pain control as needed  - DVT ppx  - OOB and ambulating as tolerated  - F/u AM labs      [facility]   Pager [phone]

## 2021-10-12 ENCOUNTER — TRANSCRIPTION ENCOUNTER (OUTPATIENT)
Age: 56
End: 2021-10-12

## 2021-10-12 VITALS
SYSTOLIC BLOOD PRESSURE: 122 MMHG | OXYGEN SATURATION: 98 % | HEART RATE: 81 BPM | RESPIRATION RATE: 6 BRPM | DIASTOLIC BLOOD PRESSURE: 65 MMHG

## 2021-10-12 PROCEDURE — C1781: CPT

## 2021-10-12 PROCEDURE — 15830 EXC EXCESSIVE SKIN ABDOMEN: CPT | Mod: XS

## 2021-10-12 PROCEDURE — C9399: CPT

## 2021-10-12 PROCEDURE — C1889: CPT

## 2021-10-12 PROCEDURE — 15734 MUSCLE-SKIN GRAFT TRUNK: CPT

## 2021-10-12 PROCEDURE — 82962 GLUCOSE BLOOD TEST: CPT

## 2021-10-12 PROCEDURE — 49565: CPT

## 2021-10-12 RX ORDER — INFLUENZA VIRUS VACCINE 15; 15; 15; 15 UG/.5ML; UG/.5ML; UG/.5ML; UG/.5ML
0.5 SUSPENSION INTRAMUSCULAR ONCE
Refills: 0 | Status: DISCONTINUED | OUTPATIENT
Start: 2021-10-12 | End: 2021-10-25

## 2021-10-12 RX ORDER — ACETAMINOPHEN 500 MG
2 TABLET ORAL
Qty: 0 | Refills: 0 | DISCHARGE
Start: 2021-10-12

## 2021-10-12 RX ORDER — TRAMADOL HYDROCHLORIDE 50 MG/1
1 TABLET ORAL
Qty: 12 | Refills: 0
Start: 2021-10-12

## 2021-10-12 RX ADMIN — OXYCODONE HYDROCHLORIDE 10 MILLIGRAM(S): 5 TABLET ORAL at 03:39

## 2021-10-12 RX ADMIN — OXYCODONE HYDROCHLORIDE 10 MILLIGRAM(S): 5 TABLET ORAL at 09:04

## 2021-10-12 RX ADMIN — HYDROMORPHONE HYDROCHLORIDE 0.5 MILLIGRAM(S): 2 INJECTION INTRAMUSCULAR; INTRAVENOUS; SUBCUTANEOUS at 06:45

## 2021-10-12 RX ADMIN — Medication 650 MILLIGRAM(S): at 06:00

## 2021-10-12 RX ADMIN — SERTRALINE 200 MILLIGRAM(S): 25 TABLET, FILM COATED ORAL at 06:52

## 2021-10-12 RX ADMIN — HYDROMORPHONE HYDROCHLORIDE 0.5 MILLIGRAM(S): 2 INJECTION INTRAMUSCULAR; INTRAVENOUS; SUBCUTANEOUS at 06:30

## 2021-10-12 RX ADMIN — Medication 650 MILLIGRAM(S): at 06:30

## 2021-10-12 RX ADMIN — OXYCODONE HYDROCHLORIDE 10 MILLIGRAM(S): 5 TABLET ORAL at 04:10

## 2021-10-12 NOTE — ASU DISCHARGE PLAN (ADULT/PEDIATRIC) - CALL YOUR DOCTOR IF YOU HAVE ANY OF THE FOLLOWING:
Bleeding that does not stop/Swelling that gets worse/Pain not relieved by Medications/Nausea and vomiting that does not stop/Unable to urinate/Inability to tolerate liquids or foods

## 2021-10-12 NOTE — ASU DISCHARGE PLAN (ADULT/PEDIATRIC) - CARE PROVIDER_API CALL
Chidi Chaves)  Surgery  [address], [address]  [place] [address]  Phone: [phone]  Fax: [phone]  Follow Up Time: 1 week    Adal Jarquin)  Plastic Surgery  [address], [address]  [place] [address]  Phone: [phone]  Fax: [phone]  Follow Up Time: 1 week

## 2021-10-12 NOTE — ASU DISCHARGE PLAN (ADULT/PEDIATRIC) - PROVIDER TOKENS
PROVIDER:[TOKEN:[[id number]],FOLLOWUP:[1 week]],PROVIDER:[TOKEN:[[id number]],FOLLOWUP:[1 week]]

## 2021-10-12 NOTE — PROGRESS NOTE ADULT - ASSESSMENT
56F s/p 10/12 ventral hernia repair with abdominal flap repair (10/11), recovering appropriately in PACU (23hr stay).    PLAN  - Pain control as needed - tramadol, no oxy per pt  - Drain teaching  - DVT ppx  - OOB and ambulating as tolerated  - Discharge: per primary team, with clinical follow up with Dr. Jarquin    Plastic Surgery   SHANTHI: [id number]  [facility]: [phone] 56F s/p 10/12 ventral hernia repair with abdominal flap repair (10/11), recovering appropriately in PACU (23hr stay).    PLAN  - Pain control as needed - tramadol, no oxy per pt  - Drain teaching  - DVT ppx  - OOB and ambulating as tolerated  - Discharge: per primary team, with clinical follow up with Dr. Jarquin    Plastic Surgery   SHANTHI: [id number]  [facility]: [phone]

## 2021-10-12 NOTE — DISCHARGE NOTE NURSING/CASE MANAGEMENT/SOCIAL WORK - PATIENT PORTAL LINK FT
You can access the FollowMyHealth Patient Portal offered by [facility] by registering at the following website: [email or web address]. By joining [employer]’s FollowMyHealth portal, you will also be able to view your health information using other applications (apps) compatible with our system.

## 2021-10-12 NOTE — PROGRESS NOTE ADULT - ASSESSMENT
Assessment:  The patient is a 56y Female who is post-op from a ventral hernia repair with abdominal flap repair (10/11), recovering in PACU (23hr stay).    Plan:  - Pain control as needed  - DVT ppx  - OOB and ambulating as tolerated  - F/u AM labs      [facility] Surgery   Pager [phone].

## 2021-10-12 NOTE — PROGRESS NOTE ADULT - SUBJECTIVE AND OBJECTIVE BOX
TEAM Surgery Progress Note    INTERVAL EVENTS: Patient is s/p ventral inguinal hernia repair with abdominal flap creation. Home meds added. She is recovering in the PACU (23hr stay).  SUBJECTIVE: Patient seen and examined at bedside with surgical team.    OBJECTIVE:    Vital Signs Last 24 Hrs  T(C): 36.1 (12 Oct 2021 00:00), Max: 36.9 (11 Oct 2021 06:19)  T(F): 97 (12 Oct 2021 00:00), Max: 98.4 (11 Oct 2021 06:19)  HR: 67 (12 Oct 2021 00:00) (64 - 82)  BP: 103/57 (12 Oct 2021 00:00) (103/57 - 139/71)  BP(mean): 75 (12 Oct 2021 00:00) (70 - 98)  RR: 16 (12 Oct 2021 00:00) (15 - 17)  SpO2: 95% (12 Oct 2021 00:00) (95% - 99%)I&O's Detail    11 Oct 2021 07:01  -  12 Oct 2021 01:46  --------------------------------------------------------  IN:    Lactated Ringers: 1365 mL    Oral Fluid: 1320 mL  Total IN: 2685 mL    OUT:    Bulb (mL): 60 mL    Bulb (mL): 120 mL    Voided (mL): 600 mL  Total OUT: 780 mL    Total NET: 1905 mL      MEDICATIONS  (STANDING):  acetaminophen   Tablet .. 650 milliGRAM(s) Oral every 6 hours  amLODIPine   Tablet 5 milliGRAM(s) Oral at bedtime  brexpiprazole 2 milliGRAM(s) Oral daily  chlorhexidine 2% Cloths 1 Application(s) Topical once  lactated ringers. 1000 milliLiter(s) (75 mL/Hr) IV Continuous <Continuous>  sertraline 200 milliGRAM(s) Oral daily  traZODone 100 milliGRAM(s) Oral at bedtime  vancomycin  IVPB 1250 milliGRAM(s) IV Intermittent once    MEDICATIONS  (PRN):  aluminum hydroxide/magnesium hydroxide/simethicone Suspension 30 milliLiter(s) Oral every 4 hours PRN Dyspepsia  HYDROmorphone  Injectable 0.5 milliGRAM(s) IV Push every 6 hours PRN Severe Pain (7 - 10)  HYDROmorphone  Injectable 0.5 milliGRAM(s) IV Push every 10 minutes PRN Severe Pain (7 - 10)  ondansetron Injectable 4 milliGRAM(s) IV Push once PRN Nausea and/or Vomiting  oxyCODONE    IR 5 milliGRAM(s) Oral every 6 hours PRN Moderate Pain (4 - 6)  oxyCODONE    IR 10 milliGRAM(s) Oral every 6 hours PRN Severe Pain (7 - 10)      PHYSICAL EXAM:  General: NAD, resting comfortably in bed  Pulmonary: Nonlabored breathing, no respiratory distress  Cardiovascular: NSR  Abdominal: soft, NT/ND, abdominal incision site c/d/i,two midline DEREK drains intact and draining serosanginous fluids  Extremities: WWP    LABS:                    IMAGING:    
Pt awake alert  Comfortable lying in bed  Complains of incisional discomfort with movement  Emily PO without nausea  VSS  Afeb  Abd-closure intact, no collection noted, soft  Drains-sero-sang output noted  A/P-doing well POD#1  -Ambulate  -Discharge home today  -Drain teaching  -Follow up with Dr. Jarquin next week
PLASTIC SURGERY PROGRESS NOTE (pg [facility]: [id number], [facility]: [phone])    SUBJECTIVE  The patient was seen and examined in PACU. No acute events overnight.    OBJECTIVE  ___________________________________________________  VITAL SIGNS / I&O's   Vital Signs Last 24 Hrs  T(C): 36.3 (12 Oct 2021 07:00), Max: 36.5 (11 Oct 2021 14:00)  T(F): 97.3 (12 Oct 2021 07:00), Max: 97.7 (11 Oct 2021 14:00)  HR: 77 (12 Oct 2021 07:00) (62 - 82)  BP: 116/74 (12 Oct 2021 07:00) (103/57 - 141/70)  BP(mean): 90 (12 Oct 2021 07:00) (70 - 99)  RR: 16 (12 Oct 2021 07:00) (15 - 17)  SpO2: 96% (12 Oct 2021 07:00) (95% - 99%)      11 Oct 2021 07:01  -  12 Oct 2021 07:00  --------------------------------------------------------  IN:    Lactated Ringers: 1890 mL    Oral Fluid: 1780 mL  Total IN: 3670 mL    OUT:    Bulb (mL): 90 mL    Bulb (mL): 160 mL    Voided (mL): 600 mL  Total OUT: 850 mL    Total NET: 2820 mL        ___________________________________________________  PHYSICAL EXAM  General: NAD, resting comfortably in chair  Pulmonary: Nonlabored breathing, no respiratory distress  Abdominal: abdominal incision site c/d/i, two midline DEREK drains with SS output  ___________________________________________________  LABS            CAPILLARY BLOOD GLUCOSE              ___________________________________________________  MICRO  Recent Cultures:    ___________________________________________________  MEDICATIONS  (STANDING):  acetaminophen   Tablet .. 650 milliGRAM(s) Oral every 6 hours  amLODIPine   Tablet 5 milliGRAM(s) Oral at bedtime  brexpiprazole 2 milliGRAM(s) Oral daily  chlorhexidine 2% Cloths 1 Application(s) Topical once  lactated ringers. 1000 milliLiter(s) (75 mL/Hr) IV Continuous <Continuous>  sertraline 200 milliGRAM(s) Oral daily  traZODone 100 milliGRAM(s) Oral at bedtime  vancomycin  IVPB 1250 milliGRAM(s) IV Intermittent once    MEDICATIONS  (PRN):  aluminum hydroxide/magnesium hydroxide/simethicone Suspension 30 milliLiter(s) Oral every 4 hours PRN Dyspepsia  HYDROmorphone  Injectable 0.5 milliGRAM(s) IV Push every 6 hours PRN Severe Pain (7 - 10)  HYDROmorphone  Injectable 0.5 milliGRAM(s) IV Push every 10 minutes PRN Severe Pain (7 - 10)  ondansetron Injectable 4 milliGRAM(s) IV Push once PRN Nausea and/or Vomiting  oxyCODONE    IR 5 milliGRAM(s) Oral every 6 hours PRN Moderate Pain (4 - 6)  oxyCODONE    IR 10 milliGRAM(s) Oral every 6 hours PRN Severe Pain (7 - 10)

## 2021-10-18 PROBLEM — Z09 POSTOPERATIVE EXAMINATION: Status: RESOLVED | Noted: 2018-10-15 | Resolved: 2021-10-18

## 2021-10-19 ENCOUNTER — APPOINTMENT (OUTPATIENT)
Dept: SURGERY | Facility: CLINIC | Age: 56
End: 2021-10-19
Payer: COMMERCIAL

## 2021-10-19 DIAGNOSIS — Z09 ENCOUNTER FOR FOLLOW-UP EXAMINATION AFTER COMPLETED TREATMENT FOR CONDITIONS OTHER THAN MALIGNANT NEOPLASM: ICD-10-CM

## 2021-10-21 ENCOUNTER — APPOINTMENT (OUTPATIENT)
Dept: SURGERY | Facility: CLINIC | Age: 56
End: 2021-10-21
Payer: COMMERCIAL

## 2021-10-21 VITALS
RESPIRATION RATE: 16 BRPM | SYSTOLIC BLOOD PRESSURE: 151 MMHG | OXYGEN SATURATION: 97 % | DIASTOLIC BLOOD PRESSURE: 87 MMHG | TEMPERATURE: 98.1 F | HEART RATE: 89 BPM

## 2021-10-21 DIAGNOSIS — Z09 ENCOUNTER FOR FOLLOW-UP EXAMINATION AFTER COMPLETED TREATMENT FOR CONDITIONS OTHER THAN MALIGNANT NEOPLASM: ICD-10-CM

## 2021-10-21 PROCEDURE — 99024 POSTOP FOLLOW-UP VISIT: CPT

## 2021-10-21 RX ORDER — CLONAZEPAM 2 MG/1
2 TABLET ORAL
Refills: 0 | Status: DISCONTINUED | COMMUNITY
End: 2021-10-21

## 2021-10-21 RX ORDER — BREXPIPRAZOLE 4 MG/1
TABLET ORAL
Refills: 0 | Status: ACTIVE | COMMUNITY

## 2021-10-21 RX ORDER — ARIPIPRAZOLE 5 MG/1
5 TABLET ORAL
Refills: 0 | Status: DISCONTINUED | COMMUNITY
End: 2021-10-21

## 2021-10-21 NOTE — HISTORY OF PRESENT ILLNESS
[de-identified] : Maty is a 55 y/o female s/p Ventral hernia repair with mesh and abdominoplasty by  on 10/11/21. Pt has some incisional discomfort. Taking Tylenol with relief. She is taking Po abx as prescribed by plastics for some cellulitis of the upper abd wall

## 2021-10-21 NOTE — ASSESSMENT
[FreeTextEntry1] : There is some cellulitis in the abdominal plasty elevated skin in the upper abdomen for which the patient has been placed on antibiotics plastic surgery

## 2022-02-23 ENCOUNTER — APPOINTMENT (OUTPATIENT)
Dept: ORTHOPEDIC SURGERY | Facility: CLINIC | Age: 57
End: 2022-02-23
Payer: COMMERCIAL

## 2022-02-23 VITALS
HEIGHT: 67 IN | SYSTOLIC BLOOD PRESSURE: 138 MMHG | WEIGHT: 188 LBS | DIASTOLIC BLOOD PRESSURE: 84 MMHG | HEART RATE: 89 BPM | BODY MASS INDEX: 29.51 KG/M2

## 2022-02-23 DIAGNOSIS — M54.16 RADICULOPATHY, LUMBAR REGION: ICD-10-CM

## 2022-02-23 PROCEDURE — 72100 X-RAY EXAM L-S SPINE 2/3 VWS: CPT

## 2022-02-23 PROCEDURE — 99203 OFFICE O/P NEW LOW 30 MIN: CPT

## 2022-02-23 NOTE — PHYSICAL EXAM
[de-identified] : Examination of the lumbar spine reveals no midline tenderness palpation, step-offs, or skin lesions. Decreased range of motion with respect to flexion, extension, lateral bending, and rotation. No tenderness to palpation of the sciatic notch. No tenderness palpation of the bilateral greater trochanters. No pain with passive internal/external rotation of the hips. No instability of bilateral lower extremities.  Negative NEIL. Negative straight leg raise bilaterally. No bowstring. Negative femoral stretch. 5 out of 5 iliopsoas, hip abductors, hips adductors, quadriceps, hamstrings, gastrocsoleus, tibialis anterior, extensor hallucis longus, peroneals. Grossly intact sensation to light touch bilateral lower extremities. 1+ patellar and Achilles reflexes. Downgoing Babinski. No clonus. Intact proprioception. Palpable pulses. No skin lesion and no edema on the right and left lower extremities. [de-identified] : AP lateral lumbar x-rays with mild spondylosis without instability or aggressive lesions

## 2022-02-23 NOTE — HISTORY OF PRESENT ILLNESS
[de-identified] : Ms. TRENT FRAZIER  is a 56 year old female who presents with 6 months of right leg pain without any specific cause or trauma.  She has minimal low back pain.  She has pain in the right leg in the lateral thigh and calf and back of her leg.  Her leg paIn is the worst when she drives.  Normal bowel and bladder control.   Denies any recent fevers, chills, sweats, weight loss, or infection.  She has used advil for symptom control. \par \par The patients past medical history, past surgical history, medications, allergies, and social history were reviewed by me today with the patient and documented accordingly.  In addition, the patient's family history, which is noncontributory to their visit, was also reviewed.\par

## 2022-02-23 NOTE — DISCUSSION/SUMMARY
[de-identified] : We discussed further treatment options.  She will try course of physical therapy.  MRI if not improved or worsened.
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
## 2022-06-01 NOTE — ED PROVIDER NOTE - NSCAREINITIATED _GEN_ER
Letter sent on 6/01 to pt regarding Primary Physician order for \" Serv to Cardiology\".    Michael Mathis(Attending)

## 2022-11-28 NOTE — H&P PST ADULT - ENERGY EXPENDITURE (METS)
[facility]  Intake Assessment Summary    Name:  Freddie Bravo Jr.    MRN: [id number]    : 2009    Date of Visit: Oct 20, 2022    Diagnoses:     F88 Unspecified Neurodevelopmental Disorder     Session Type: Intake Assessment      Mood: normal    Affect: blunted    Behavior: cooperative    Oriented: oriented to time, place and person        Focus of Appointment: Freddie is a 13 year old boy who presents with difficulties related to social skills. He attended this session with his mother (henceforth referred to as parent) to briefly assess his social communication and socio-emotional and behavioral functioning in order to determine appropriateness for treatment services through this clinic. Treatment options appropriate for his needs were discussed with the family.     Procedures/Assessments Administered:  Brief Record Review  Clinical Interview  Behavior Assessment System for Children - 3rd Edition (BASC-3)    Current Concerns and History: Freddie's parent completed an interview with Dr. Weiner to assess his history of difficulties and current concerns. Freddie was previously diagnosed with Unspecified Neurodevelopmental Disorder by Dr. Lanre Duncan at this clinic.     Freddie's parent reported concerns related to social skills. He has difficulty reading social skills, understanding social relationships and situations, and making friends. His mother also reported that Freddie has some language and learning delays that can make social interactions difficult or him. His parent reported that he may benefit from learning skills related to making and keeping friends, conversation skills, using and interpreting nonverbal communication, identifying and navigating social cues, problem-solving skills, initiating social interactions, maintaining social interactions, perspective taking,.     Assessment and Results:     Freddie's parent also completed the Behavior Assessment System for  Children - 3rd Edition to assess his current social-emotional and behavioral functioning. This measure will be summarized when it is completed and returned.     Behavior Assessment System for Children, 3rd Edition (BASC-3) - Parent Report    Scales     T Score     Clinical Scales       -   Hyperactivity          Aggression          Conduct Problems          Anxiety          Depression          Somatization          Atypicality          Withdrawal          Attention Problems          Adaptive Scales     -     Adaptability          Social Skills          Leadership          Activities of Daily Living          Functional Communication          Composites       -   Externalizing Problems          Internalizing Problems          Behavioral Symptoms Index          Adaptive Skills          * at risk  ** clinically significant                                               Summary:  I met with Freddie and his parent to assess appropriateness for group therapy in this clinic. Freddie's parent completed a brief interview regarding history and current concerns. Freddie completed brief neuropsychological testing to assess symptoms related to social, communication, emotional and behavioral functioning. Results indicated that Freddie demonstrates concerns related to social skills that are consistent with his current diagnosis.    We discussed the treatment group options for adolescents and their families offered through this clinic. At this time, we do not have any groups that would be appropriate for Freddie given his current needs. Freddie would benefit from participation in a social skills program with children who have similar cognitive and language skills. We hope to offer an adapted social skills program in the future that would be appropriate for Freddie; however, we do not currently have a start date for this group. The family is very interested in participating in this group in the future. Our  will call the family when this  group becomes available. The family indicated their understanding and agreed with this plan.     Recommendations/Plan:      - Freddie would benefit from an adapted social skills program that focuses on friendship skills in the future.     - Continue with current services and supports.      We look forward to having Freddie's family participate in treatment at our clinic. Please contact our clinic with any questions at [phone].     Claudia Weiner, PhD, LP    Department of Pediatrics  [facility]    Attestation:    Neurobehavioral Status Exam Performed by a Psychologist (59248: first hour, 75939: each additional hour)  Neurobehavioral Status Exam testing was administered on Oct 20, 2022 by Claudia Weiner, Ph.D., . Total time spent in test administration and scoring was 1.5 hours.    Freddie is a 13 year old who is being evaluated via a billable video visit.      How would you like to obtain your AVS? MyChart  If the video visit is dropped, the invitation should be resent by: Send to e-mail at: [email or web address]  Will anyone else be joining your video visit? No          Video-Visit Details    Video Start Time: 3 PM    Type of service:  Video Visit    Video End Time:4 pm    Originating Location (pt. Location): Home      Distant Location (provider location):  Off-site    Platform used for Video Visit: Zoom         DASI METS 9.89

## 2022-12-05 NOTE — ED ADULT TRIAGE NOTE - AS O2 DELIVERY
[FreeTextEntry1] : Rapid strep test was negative today. \par If throat culture returns positive, please give Amoxicillin 500 mg BID x 10 days. \par Return to office as needed or if fever or pain persists more than 48 hours.\par \par trial albuterol\par \par Symptoms likely due to viral URI. \par Recommend supportive care including antipyretics, fluids, nasal saline followed by nasal suction and use of humidifier. Discussed honey for cough if over age 1. Consider Mucinex for older kids.\par Return if symptoms worsen or persist.\par  room air

## 2023-01-03 ENCOUNTER — OFFICE (OUTPATIENT)
Dept: URBAN - METROPOLITAN AREA CLINIC 90 | Facility: CLINIC | Age: 58
Setting detail: OPHTHALMOLOGY
End: 2023-01-03
Payer: COMMERCIAL

## 2023-01-03 DIAGNOSIS — H01.004: ICD-10-CM

## 2023-01-03 DIAGNOSIS — H25.13: ICD-10-CM

## 2023-01-03 DIAGNOSIS — H47.293: ICD-10-CM

## 2023-01-03 DIAGNOSIS — H01.001: ICD-10-CM

## 2023-01-03 PROCEDURE — 92133 CPTRZD OPH DX IMG PST SGM ON: CPT | Performed by: OPHTHALMOLOGY

## 2023-01-03 PROCEDURE — 92083 EXTENDED VISUAL FIELD XM: CPT | Performed by: OPHTHALMOLOGY

## 2023-01-03 PROCEDURE — 92014 COMPRE OPH EXAM EST PT 1/>: CPT | Performed by: OPHTHALMOLOGY

## 2023-01-03 ASSESSMENT — REFRACTION_AUTOREFRACTION
OS_CYLINDER: -0.25
OD_CYLINDER: -0.50
OS_SPHERE: +1.25
OS_AXIS: 122
OD_SPHERE: +1.25
OD_AXIS: 099

## 2023-01-03 ASSESSMENT — REFRACTION_CURRENTRX
OS_SPHERE: +0.75
OS_CYLINDER: SPH
OS_OVR_VA: 20/
OS_ADD: +2.00
OD_VPRISM_DIRECTION: PROGS
OD_SPHERE: +0.50
OD_ADD: +2.50
OD_OVR_VA: 20/
OD_AXIS: 091
OD_CYLINDER: -0.50
OS_SPHERE: +0.50
OS_CYLINDER: -0.25
OS_AXIS: 007
OD_VPRISM_DIRECTION: PROGS
OD_SPHERE: +0.25
OD_OVR_VA: 20/
OD_CYLINDER: +0.25
OS_VPRISM_DIRECTION: PROGS
OD_CYLINDER: -0.25
OD_AXIS: 166
OS_VPRISM_DIRECTION: PROGS
OD_OVR_VA: 20/
OS_OVR_VA: 20/
OS_OVR_VA: 20/
OS_SPHERE: +0.75
OS_VPRISM_DIRECTION: PROGS
OD_ADD: +2.00
OD_AXIS: 017
OD_ADD: +2.25
OD_VPRISM_DIRECTION: PROGS
OS_ADD: +2.25
OS_ADD: +2.50
OD_SPHERE: +0.25

## 2023-01-03 ASSESSMENT — REFRACTION_MANIFEST
OS_SPHERE: +1.75
OD_ADD: +2.25
OS_VA1: 20/20
OD_VA1: 20/20
OS_SPHERE: +0.75
OS_AXIS: 144
OD_CYLINDER: SPHERE
OD_SPHERE: +0.25
OD_CYLINDER: SPH
OD_CYLINDER: SPH
OD_SPHERE: +0.25
OS_SPHERE: +0.75
OS_CYLINDER: SPH
OS_VA1: 20/20
OS_SPHERE: +0.50
OD_SPHERE: +0.25
OS_ADD: +2.00
OS_VA1: 20/20
OD_VA1: 20/20
OD_VA1: 20/20-2
OS_CYLINDER: -0.50
OS_CYLINDER: SPH
OD_ADD: +2.00
OD_VA2: 20/20
OD_ADD: +1.00
OS_ADD: +2.50
OS_ADD: +2.25
OD_VA2: 20/20
OD_SPHERE: +0.50
OS_VA2: 20/20
OU_VA: 20/20
OD_VA1: 20/20
OS_CYLINDER: -0.50
OD_ADD: +2.50
OD_CYLINDER: SPH
OS_ADD: +2.25
OS_SPHERE: +0.75
OD_SPHERE: +1.75
OS_VA1: 20/20
OS_CYLINDER: SPHERE
OS_AXIS: 144
OS_VA2: 20/20
OD_CYLINDER: SPH
OS_ADD: +2.50
OD_VA2: 20/20
OS_VA2: 20/20
OD_ADD: +2.25
OD_ADD: +2.50
OS_ADD: +1.00

## 2023-01-03 ASSESSMENT — KERATOMETRY
OS_AXISANGLE_DEGREES: 066
OD_K2POWER_DIOPTERS: 45.75
OS_K1POWER_DIOPTERS: 46.00
METHOD_AUTO_MANUAL: AUTO
OD_K1POWER_DIOPTERS: 45.75
OS_K2POWER_DIOPTERS: 46.75
OD_AXISANGLE_DEGREES: 090

## 2023-01-03 ASSESSMENT — SPHEQUIV_DERIVED
OD_SPHEQUIV: 1
OS_SPHEQUIV: 1.125
OS_SPHEQUIV: 0.5
OS_SPHEQUIV: 0.5

## 2023-01-03 ASSESSMENT — TONOMETRY
OD_IOP_MMHG: 18
OS_IOP_MMHG: 18

## 2023-01-03 ASSESSMENT — VISUAL ACUITY
OS_BCVA: 20/20-1
OD_BCVA: 20/25+2

## 2023-01-03 ASSESSMENT — SUPERFICIAL PUNCTATE KERATITIS (SPK)
OS_SPK: 2+
OD_SPK: 2+

## 2023-01-03 ASSESSMENT — AXIALLENGTH_DERIVED
OS_AL: 22.1866
OD_AL: 22.4355
OS_AL: 22.4041
OS_AL: 22.4041

## 2023-01-03 ASSESSMENT — LID EXAM ASSESSMENTS
OD_BLEPHARITIS: RUL 2+
OS_BLEPHARITIS: LUL 2+

## 2024-01-12 NOTE — H&P PST ADULT - FALLEN IN THE PAST
January 12, 2024     Patient: Quang Swenson   YOB: 2007   Date of Visit: 1/12/2024       To Whom It May Concern:    It is my medical opinion that Quang Swenson may return to school 1/15/2024.    If you have any questions or concerns, please don't hesitate to call.         Sincerely,        Dk Nascimento, MOON-CNP    CC: No Recipients  
no

## 2024-02-05 NOTE — H&P PST ADULT - PAIN SCALE PREFERRED, PROFILE
Writer called patient. No answer. Left message to call back clinic. Awaiting for patient to return call at this time.     none

## 2024-02-07 ENCOUNTER — OFFICE (OUTPATIENT)
Dept: URBAN - METROPOLITAN AREA CLINIC 90 | Facility: CLINIC | Age: 59
Setting detail: OPHTHALMOLOGY
End: 2024-02-07
Payer: COMMERCIAL

## 2024-02-07 DIAGNOSIS — H01.001: ICD-10-CM

## 2024-02-07 DIAGNOSIS — H01.004: ICD-10-CM

## 2024-02-07 DIAGNOSIS — H47.293: ICD-10-CM

## 2024-02-07 DIAGNOSIS — H25.13: ICD-10-CM

## 2024-02-07 DIAGNOSIS — H52.4: ICD-10-CM

## 2024-02-07 PROCEDURE — 92014 COMPRE OPH EXAM EST PT 1/>: CPT | Performed by: OPHTHALMOLOGY

## 2024-02-07 PROCEDURE — 92133 CPTRZD OPH DX IMG PST SGM ON: CPT | Performed by: OPHTHALMOLOGY

## 2024-02-07 PROCEDURE — 92015 DETERMINE REFRACTIVE STATE: CPT | Performed by: OPHTHALMOLOGY

## 2024-02-07 ASSESSMENT — SUPERFICIAL PUNCTATE KERATITIS (SPK)
OD_SPK: 2+
OS_SPK: 2+

## 2024-02-07 ASSESSMENT — CONFRONTATIONAL VISUAL FIELD TEST (CVF)
OD_FINDINGS: FULL
OS_FINDINGS: FULL

## 2024-02-07 ASSESSMENT — LID EXAM ASSESSMENTS
OD_BLEPHARITIS: RUL 2+
OS_BLEPHARITIS: LUL 2+

## 2024-02-10 ASSESSMENT — REFRACTION_MANIFEST
OS_VA1: 20/25
OD_VA2: 20/20
OS_CYLINDER: SPH
OD_VA2: 20/20
OD_ADD: +1.00
OS_VA2: 20/20
OD_CYLINDER: SPH
OS_SPHERE: +0.50
OD_SPHERE: +1.75
OS_SPHERE: +0.75
OS_AXIS: 144
OS_ADD: +2.50
OD_SPHERE: +0.25
OD_ADD: +2.50
OS_VA1: 20/20
OD_CYLINDER: SPH
OD_VA1: 20/20
OD_ADD: +2.50
OS_VA1: 20/20
OS_VA2: 20/20
OD_VA1: 20/20-2
OS_ADD: +2.50
OS_VA1: 20/20
OD_VA2: 20/20
OD_ADD: +2.00
OD_CYLINDER: SPH
OS_ADD: +2.75
OS_VA2: 20/20
OU_VA: 20/20
OS_CYLINDER: -0.50
OD_ADD: +2.25
OD_SPHERE: +0.25
OD_CYLINDER: SPHERE
OS_SPHERE: +0.75
OD_CYLINDER: -0.75
OS_ADD: +2.25
OD_ADD: +2.75
OS_ADD: +2.25
OD_AXIS: 100
OS_CYLINDER: SPHERE
OD_VA1: 20/20
OS_CYLINDER: -0.50
OS_CYLINDER: SPH
OS_VA1: 20/20
OD_VA1: 20/20
OS_SPHERE: +0.75
OD_SPHERE: +1.00
OS_AXIS: 070
OS_ADD: +1.00
OD_VA1: 20/20
OD_CYLINDER: SPH
OD_SPHERE: +0.25
OS_CYLINDER: -0.25
OD_ADD: +2.25
OD_SPHERE: +0.50
OS_SPHERE: +1.00
OS_ADD: +2.00
OS_AXIS: 144
OS_SPHERE: +1.75

## 2024-02-10 ASSESSMENT — SPHEQUIV_DERIVED
OD_SPHEQUIV: 0.625
OS_SPHEQUIV: 1.375
OS_SPHEQUIV: 0.875
OD_SPHEQUIV: 1.125
OS_SPHEQUIV: 0.5
OS_SPHEQUIV: 0.5

## 2024-02-10 ASSESSMENT — REFRACTION_CURRENTRX
OS_VPRISM_DIRECTION: PROGS
OS_AXIS: 007
OD_AXIS: 180
OS_SPHERE: +0.75
OD_VPRISM_DIRECTION: PROGS
OD_AXIS: 166
OD_OVR_VA: 20/
OD_VPRISM_DIRECTION: PROGS
OD_AXIS: 091
OD_CYLINDER: -0.50
OD_OVR_VA: 20/
OS_ADD: +2.50
OD_ADD: +2.00
OD_CYLINDER: -0.25
OS_AXIS: 180
OD_ADD: +2.25
OD_VPRISM_DIRECTION: PROGS
OS_OVR_VA: 20/
OD_AXIS: 017
OS_SPHERE: +0.50
OD_ADD: +2.50
OS_ADD: +2.00
OS_VPRISM_DIRECTION: PROGS
OS_SPHERE: +2.75
OS_VPRISM_DIRECTION: PROGS
OD_CYLINDER: +0.25
OS_OVR_VA: 20/
OS_CYLINDER: 0.00
OD_SPHERE: +0.25
OD_SPHERE: +0.50
OS_OVR_VA: 20/
OD_SPHERE: +0.25
OD_CYLINDER: 0.00
OS_CYLINDER: SPH
OS_ADD: +2.25
OD_SPHERE: +2.75
OS_CYLINDER: -0.25
OD_OVR_VA: 20/
OS_SPHERE: +0.75

## 2024-02-10 ASSESSMENT — REFRACTION_AUTOREFRACTION
OS_SPHERE: +1.50
OS_AXIS: 072
OD_SPHERE: +1.50
OD_AXIS: 101
OS_CYLINDER: -0.25
OD_CYLINDER: -0.75

## 2024-07-15 ENCOUNTER — NON-APPOINTMENT (OUTPATIENT)
Age: 59
End: 2024-07-15

## 2024-07-26 ENCOUNTER — NON-APPOINTMENT (OUTPATIENT)
Age: 59
End: 2024-07-26

## 2024-07-28 ENCOUNTER — OFFICE (OUTPATIENT)
Dept: URBAN - METROPOLITAN AREA CLINIC 90 | Facility: CLINIC | Age: 59
Setting detail: OPHTHALMOLOGY
End: 2024-07-28
Payer: COMMERCIAL

## 2024-07-28 DIAGNOSIS — H25.13: ICD-10-CM

## 2024-07-28 DIAGNOSIS — H01.001: ICD-10-CM

## 2024-07-28 DIAGNOSIS — H16.223: ICD-10-CM

## 2024-07-28 DIAGNOSIS — H01.004: ICD-10-CM

## 2024-07-28 DIAGNOSIS — H10.533: ICD-10-CM

## 2024-07-28 PROCEDURE — 99213 OFFICE O/P EST LOW 20 MIN: CPT | Performed by: OPHTHALMOLOGY

## 2024-07-28 ASSESSMENT — LID EXAM ASSESSMENTS
OD_BLEPHARITIS: RUL 2+
OS_BLEPHARITIS: LUL 2+

## 2024-07-28 ASSESSMENT — CONFRONTATIONAL VISUAL FIELD TEST (CVF)
OS_FINDINGS: FULL
OD_FINDINGS: FULL

## 2024-10-14 ENCOUNTER — RESULT REVIEW (OUTPATIENT)
Age: 59
End: 2024-10-14

## 2024-10-21 ENCOUNTER — NON-APPOINTMENT (OUTPATIENT)
Age: 59
End: 2024-10-21

## 2024-12-06 ENCOUNTER — RX ONLY (RX ONLY)
Age: 59
End: 2024-12-06

## 2024-12-06 ENCOUNTER — OFFICE (OUTPATIENT)
Facility: LOCATION | Age: 59
Setting detail: OPHTHALMOLOGY
End: 2024-12-06
Payer: COMMERCIAL

## 2024-12-06 DIAGNOSIS — H52.4: ICD-10-CM

## 2024-12-06 DIAGNOSIS — H01.001: ICD-10-CM

## 2024-12-06 DIAGNOSIS — H01.002: ICD-10-CM

## 2024-12-06 DIAGNOSIS — H01.005: ICD-10-CM

## 2024-12-06 DIAGNOSIS — H01.004: ICD-10-CM

## 2024-12-06 DIAGNOSIS — H16.223: ICD-10-CM

## 2024-12-06 PROCEDURE — 92015 DETERMINE REFRACTIVE STATE: CPT | Performed by: STUDENT IN AN ORGANIZED HEALTH CARE EDUCATION/TRAINING PROGRAM

## 2024-12-06 PROCEDURE — 92012 INTRM OPH EXAM EST PATIENT: CPT | Mod: 25 | Performed by: STUDENT IN AN ORGANIZED HEALTH CARE EDUCATION/TRAINING PROGRAM

## 2024-12-06 PROCEDURE — 68761 CLOSE TEAR DUCT OPENING: CPT | Mod: 26,E4,E2 | Performed by: STUDENT IN AN ORGANIZED HEALTH CARE EDUCATION/TRAINING PROGRAM

## 2024-12-06 ASSESSMENT — CONFRONTATIONAL VISUAL FIELD TEST (CVF)
OD_FINDINGS: FULL
OS_FINDINGS: FULL

## 2024-12-06 ASSESSMENT — KERATOMETRY
OD_K2POWER_DIOPTERS: 46.00
OD_K1POWER_DIOPTERS: 45.75
OS_AXISANGLE_DEGREES: 090
METHOD_AUTO_MANUAL: AUTO
OS_K1POWER_DIOPTERS: 46.25
OD_AXISANGLE_DEGREES: 082
OS_K2POWER_DIOPTERS: 46.25

## 2024-12-06 ASSESSMENT — REFRACTION_MANIFEST
OS_VA2: 20/20
OD_SPHERE: +0.25
OD_ADD: +2.00
OS_VA1: 20/20
OS_CYLINDER: SPHERE
OS_AXIS: 070
OS_AXIS: 000
OD_ADD: +2.50
OS_VA1: 20/20
OS_VA1: 20/20
OS_CYLINDER: -0.50
OD_ADD: +2.75
OS_ADD: +2.00
OS_SPHERE: +0.75
OD_SPHERE: +1.00
OD_VA1: 20/20
OD_VA1: 20/20
OS_SPHERE: +1.00
OD_VA1: 20/20-2
OD_SPHERE: +1.75
OS_SPHERE: +0.75
OS_CYLINDER: -0.25
OS_VA1: 20/25
OD_VA1: 20/20
OS_VA2: 20/20
OS_CYLINDER: SPHERE
OD_SPHERE: +0.25
OS_CYLINDER: SPH
OS_VA2: 20/20
OD_AXIS: 000
OD_VA1: 20/20
OS_SPHERE: +1.75
OD_VA2: 20/20
OD_ADD: +2.25
OD_SPHERE: +0.50
OD_CYLINDER: SPH
OD_SPHERE: +0.25
OD_CYLINDER: SPHERE
OS_ADD: +2.25
OD_ADD: +2.25
OD_AXIS: 100
OS_ADD: +2.50
OS_ADD: +2.75
OD_CYLINDER: SPH
OS_ADD: +2.50
OD_VA2: 20/20
OD_CYLINDER: SPH
OS_VA1: 20/20
OD_VA1: 20/20
OD_SPHERE: +1.00
OU_VA: 20/20
OS_AXIS: 144
OS_ADD: +2.75
OS_CYLINDER: -0.50
OS_ADD: +2.25
OS_SPHERE: +0.75
OS_CYLINDER: SPH
OS_SPHERE: +0.50
OD_CYLINDER: SPHERE
OD_CYLINDER: SPH
OD_ADD: +1.00
OS_SPHERE: +1.25
OS_ADD: +1.00
OD_ADD: +2.75
OD_ADD: +2.50
OD_VA2: 20/20
OS_VA1: 20/20
OS_AXIS: 144
OD_CYLINDER: -0.75

## 2024-12-06 ASSESSMENT — REFRACTION_CURRENTRX
OD_ADD: +2.50
OS_ADD: +2.50
OD_ADD: +2.00
OD_CYLINDER: -0.50
OD_SPHERE: +0.75
OD_ADD: +2.75
OS_CYLINDER: -0.75
OS_ADD: +2.75
OD_OVR_VA: 20/
OD_SPHERE: +2.75
OS_SPHERE: +0.75
OS_SPHERE: +0.75
OD_AXIS: 099
OD_VPRISM_DIRECTION: PROGS
OD_SPHERE: +0.50
OS_SPHERE: +0.50
OS_CYLINDER: 0.00
OS_OVR_VA: 20/
OD_AXIS: 017
OD_CYLINDER: -0.25
OS_AXIS: 180
OS_OVR_VA: 20/
OD_AXIS: 180
OS_ADD: +2.25
OD_OVR_VA: 20/
OD_AXIS: 091
OD_CYLINDER: 0.00
OS_CYLINDER: SPH
OS_ADD: +2.00
OD_SPHERE: +0.25
OS_AXIS: 090
OS_OVR_VA: 20/
OS_AXIS: 007
OD_VPRISM_DIRECTION: PROGS
OS_CYLINDER: -0.25
OS_VPRISM_DIRECTION: PROGS
OD_SPHERE: +0.25
OS_SPHERE: +1.50
OD_VPRISM_DIRECTION: PROGS
OS_VPRISM_DIRECTION: PROGS
OD_CYLINDER: -0.50
OD_OVR_VA: 20/
OS_VPRISM_DIRECTION: PROGS
OD_CYLINDER: +0.25
OD_AXIS: 166
OS_SPHERE: +2.75
OD_ADD: +2.25

## 2024-12-06 ASSESSMENT — LID EXAM ASSESSMENTS
OS_MEIBOMITIS: 2+
OD_MEIBOMITIS: 2+

## 2024-12-06 ASSESSMENT — TONOMETRY
OD_IOP_MMHG: 19
OS_IOP_MMHG: 17

## 2024-12-06 ASSESSMENT — VISUAL ACUITY
OD_BCVA: 20/30+2
OS_BCVA: 20/30-2

## 2024-12-06 ASSESSMENT — SUPERFICIAL PUNCTATE KERATITIS (SPK)
OD_SPK: 2+
OS_SPK: 2+

## 2024-12-06 ASSESSMENT — REFRACTION_AUTOREFRACTION
OS_AXIS: 0
OS_CYLINDER: 0.00
OD_CYLINDER: -0.25
OS_SPHERE: +1.25
OD_AXIS: 096
OD_SPHERE: +1.00

## 2024-12-16 ENCOUNTER — OFFICE (OUTPATIENT)
Facility: LOCATION | Age: 59
Setting detail: OPHTHALMOLOGY
End: 2024-12-16
Payer: COMMERCIAL

## 2024-12-16 DIAGNOSIS — H16.223: ICD-10-CM

## 2024-12-16 DIAGNOSIS — H01.005: ICD-10-CM

## 2024-12-16 DIAGNOSIS — H01.004: ICD-10-CM

## 2024-12-16 DIAGNOSIS — H01.002: ICD-10-CM

## 2024-12-16 DIAGNOSIS — H01.001: ICD-10-CM

## 2024-12-16 PROCEDURE — 92012 INTRM OPH EXAM EST PATIENT: CPT | Performed by: STUDENT IN AN ORGANIZED HEALTH CARE EDUCATION/TRAINING PROGRAM

## 2024-12-16 ASSESSMENT — SUPERFICIAL PUNCTATE KERATITIS (SPK)
OS_SPK: 2+
OD_SPK: 2+

## 2024-12-16 ASSESSMENT — CONFRONTATIONAL VISUAL FIELD TEST (CVF)
OD_FINDINGS: FULL
OS_FINDINGS: FULL

## 2024-12-16 ASSESSMENT — LID EXAM ASSESSMENTS
OS_MEIBOMITIS: 2+
OD_MEIBOMITIS: 2+

## 2024-12-16 ASSESSMENT — TONOMETRY
OS_IOP_MMHG: 18
OD_IOP_MMHG: 18

## 2024-12-17 ASSESSMENT — REFRACTION_CURRENTRX
OD_OVR_VA: 20/
OS_SPHERE: +0.50
OS_ADD: +2.25
OS_SPHERE: +1.50
OS_SPHERE: +0.75
OS_OVR_VA: 20/
OS_VPRISM_DIRECTION: PROGS
OD_AXIS: 091
OD_ADD: +2.25
OD_VPRISM_DIRECTION: PROGS
OS_ADD: +2.75
OS_OVR_VA: 20/
OS_CYLINDER: -0.75
OD_ADD: +2.00
OD_SPHERE: +0.25
OD_CYLINDER: -0.25
OS_SPHERE: +0.75
OD_AXIS: 099
OD_CYLINDER: -0.50
OS_AXIS: 007
OD_AXIS: 180
OD_SPHERE: +0.50
OD_CYLINDER: 0.00
OD_SPHERE: +0.75
OD_VPRISM_DIRECTION: PROGS
OS_AXIS: 090
OS_ADD: +2.50
OD_CYLINDER: +0.25
OD_SPHERE: +2.75
OD_OVR_VA: 20/
OD_AXIS: 166
OS_SPHERE: +2.75
OD_VPRISM_DIRECTION: PROGS
OS_VPRISM_DIRECTION: PROGS
OD_ADD: +2.75
OS_AXIS: 180
OD_SPHERE: +0.25
OD_CYLINDER: -0.50
OS_CYLINDER: SPH
OS_VPRISM_DIRECTION: PROGS
OS_OVR_VA: 20/
OS_CYLINDER: -0.25
OS_ADD: +2.00
OD_OVR_VA: 20/
OS_CYLINDER: 0.00
OD_ADD: +2.50
OD_AXIS: 017

## 2024-12-17 ASSESSMENT — REFRACTION_MANIFEST
OS_ADD: +2.75
OS_SPHERE: +1.25
OD_CYLINDER: SPH
OD_SPHERE: +0.25
OS_SPHERE: +0.50
OD_ADD: +2.75
OD_CYLINDER: -0.75
OS_SPHERE: +0.75
OS_VA2: 20/20
OD_ADD: +2.00
OS_SPHERE: +0.75
OS_SPHERE: +1.75
OS_CYLINDER: -0.25
OS_SPHERE: +0.75
OS_CYLINDER: SPH
OS_CYLINDER: SPHERE
OS_AXIS: 070
OD_ADD: +2.75
OS_ADD: +2.50
OD_VA1: 20/20
OD_VA1: 20/20
OS_AXIS: 000
OD_SPHERE: +1.75
OD_AXIS: 100
OS_SPHERE: +1.00
OS_VA1: 20/25
OU_VA: 20/20
OS_VA1: 20/20
OS_ADD: +2.75
OD_CYLINDER: SPH
OS_ADD: +1.00
OD_CYLINDER: SPH
OS_VA1: 20/20
OS_VA1: 20/20
OD_VA2: 20/20
OD_SPHERE: +0.25
OD_ADD: +2.50
OD_VA1: 20/20
OS_VA2: 20/20
OS_AXIS: 144
OD_VA2: 20/20
OD_VA1: 20/20
OS_ADD: +2.25
OS_ADD: +2.50
OD_SPHERE: +1.00
OD_VA1: 20/20-2
OS_ADD: +2.00
OS_AXIS: 144
OD_SPHERE: +0.25
OD_ADD: +2.25
OS_VA1: 20/20
OS_CYLINDER: SPHERE
OS_CYLINDER: SPH
OD_SPHERE: +0.50
OS_VA1: 20/20
OD_ADD: +2.50
OS_VA2: 20/20
OD_ADD: +1.00
OD_CYLINDER: SPHERE
OS_CYLINDER: -0.50
OD_ADD: +2.25
OD_AXIS: 000
OD_VA2: 20/20
OD_CYLINDER: SPH
OD_SPHERE: +1.00
OD_CYLINDER: SPHERE
OD_VA1: 20/20
OS_ADD: +2.25
OS_CYLINDER: -0.50

## 2024-12-17 ASSESSMENT — KERATOMETRY
METHOD_AUTO_MANUAL: AUTO
OD_K2POWER_DIOPTERS: 46.00
OS_AXISANGLE_DEGREES: 090
OS_K1POWER_DIOPTERS: 46.25
OD_K1POWER_DIOPTERS: 45.75
OS_K2POWER_DIOPTERS: 46.25
OD_AXISANGLE_DEGREES: 082

## 2024-12-17 ASSESSMENT — VISUAL ACUITY
OD_BCVA: 20/30+2
OS_BCVA: 20/30-2

## 2024-12-17 ASSESSMENT — REFRACTION_AUTOREFRACTION
OS_CYLINDER: 0.00
OS_AXIS: 0
OD_CYLINDER: -0.25
OS_SPHERE: +1.25
OD_SPHERE: +1.00
OD_AXIS: 096

## 2025-01-04 ENCOUNTER — OFFICE (OUTPATIENT)
Facility: LOCATION | Age: 60
Setting detail: OPHTHALMOLOGY
End: 2025-01-04
Payer: COMMERCIAL

## 2025-01-04 DIAGNOSIS — H57.02: ICD-10-CM

## 2025-01-04 DIAGNOSIS — H25.13: ICD-10-CM

## 2025-01-04 DIAGNOSIS — H01.002: ICD-10-CM

## 2025-01-04 DIAGNOSIS — H47.293: ICD-10-CM

## 2025-01-04 DIAGNOSIS — H01.004: ICD-10-CM

## 2025-01-04 DIAGNOSIS — H01.005: ICD-10-CM

## 2025-01-04 DIAGNOSIS — H01.001: ICD-10-CM

## 2025-01-04 DIAGNOSIS — H53.40: ICD-10-CM

## 2025-01-04 DIAGNOSIS — H16.223: ICD-10-CM

## 2025-01-04 PROCEDURE — 92012 INTRM OPH EXAM EST PATIENT: CPT | Performed by: STUDENT IN AN ORGANIZED HEALTH CARE EDUCATION/TRAINING PROGRAM

## 2025-01-04 ASSESSMENT — LID EXAM ASSESSMENTS
OS_MEIBOMITIS: 2+
OD_MEIBOMITIS: 2+

## 2025-01-04 ASSESSMENT — REFRACTION_CURRENTRX
OD_SPHERE: +0.75
OD_AXIS: 180
OS_SPHERE: +0.50
OS_VPRISM_DIRECTION: PROGS
OD_AXIS: 099
OD_ADD: +2.25
OS_AXIS: 090
OS_ADD: +2.50
OD_AXIS: 166
OS_CYLINDER: 0.00
OS_CYLINDER: -0.75
OD_SPHERE: +0.25
OD_VPRISM_DIRECTION: PROGS
OD_ADD: +2.00
OD_OVR_VA: 20/
OS_VPRISM_DIRECTION: PROGS
OD_CYLINDER: -0.50
OD_SPHERE: +0.50
OD_VPRISM_DIRECTION: PROGS
OS_AXIS: 180
OS_SPHERE: +2.75
OS_SPHERE: +0.75
OD_ADD: +2.50
OD_CYLINDER: -0.25
OD_OVR_VA: 20/
OS_SPHERE: +1.50
OS_CYLINDER: SPH
OS_ADD: +2.75
OS_CYLINDER: -0.25
OD_ADD: +2.75
OS_OVR_VA: 20/
OD_SPHERE: +0.25
OS_OVR_VA: 20/
OD_SPHERE: +2.75
OD_CYLINDER: 0.00
OS_SPHERE: +0.75
OS_VPRISM_DIRECTION: PROGS
OS_AXIS: 007
OS_ADD: +2.00
OD_VPRISM_DIRECTION: PROGS
OD_OVR_VA: 20/
OD_CYLINDER: -0.50
OS_ADD: +2.25
OD_AXIS: 017
OD_AXIS: 091
OS_OVR_VA: 20/
OD_CYLINDER: +0.25

## 2025-01-04 ASSESSMENT — TONOMETRY
OS_IOP_MMHG: 16
OD_IOP_MMHG: 17

## 2025-01-04 ASSESSMENT — REFRACTION_MANIFEST
OS_SPHERE: +1.75
OS_AXIS: 070
OS_CYLINDER: SPH
OS_VA1: 20/20
OS_ADD: +2.25
OD_CYLINDER: SPH
OS_CYLINDER: SPH
OD_VA1: 20/20
OD_CYLINDER: -0.75
OS_CYLINDER: SPHERE
OS_VA2: 20/20
OD_SPHERE: +0.50
OD_ADD: +2.75
OD_VA2: 20/20
OS_VA1: 20/20
OD_AXIS: 000
OD_ADD: +2.00
OD_VA1: 20/20-2
OD_SPHERE: +1.75
OD_VA1: 20/20
OS_CYLINDER: -0.25
OS_CYLINDER: -0.50
OD_CYLINDER: SPH
OS_SPHERE: +0.75
OS_CYLINDER: SPHERE
OU_VA: 20/20
OD_CYLINDER: SPHERE
OD_VA2: 20/20
OD_ADD: +2.25
OD_AXIS: 100
OS_SPHERE: +0.50
OD_VA2: 20/20
OS_ADD: +2.00
OD_SPHERE: +0.25
OS_VA2: 20/20
OS_ADD: +2.25
OS_SPHERE: +0.75
OS_VA2: 20/20
OD_VA1: 20/20
OD_CYLINDER: SPH
OD_ADD: +2.25
OD_ADD: +2.50
OD_VA1: 20/20
OD_SPHERE: +1.00
OD_CYLINDER: SPH
OD_ADD: +2.75
OS_ADD: +1.00
OD_SPHERE: +1.00
OS_VA1: 20/20
OS_ADD: +2.75
OS_SPHERE: +1.25
OD_VA1: 20/20
OD_SPHERE: +0.25
OD_SPHERE: +0.25
OS_VA1: 20/20
OS_ADD: +2.75
OS_ADD: +2.50
OD_ADD: +1.00
OS_VA1: 20/20
OS_CYLINDER: -0.50
OS_SPHERE: +0.75
OS_VA1: 20/25
OS_AXIS: 000
OS_SPHERE: +1.00
OS_ADD: +2.50
OS_AXIS: 144
OD_ADD: +2.50
OS_AXIS: 144
OD_CYLINDER: SPHERE

## 2025-01-04 ASSESSMENT — KERATOMETRY
OS_K1POWER_DIOPTERS: 46.50
OS_AXISANGLE_DEGREES: 090
OD_AXISANGLE_DEGREES: 080
OS_K2POWER_DIOPTERS: 46.50
METHOD_AUTO_MANUAL: AUTO
OD_K1POWER_DIOPTERS: 45.75
OD_K2POWER_DIOPTERS: 46.00

## 2025-01-04 ASSESSMENT — CONFRONTATIONAL VISUAL FIELD TEST (CVF)
OD_FINDINGS: FULL
OS_FINDINGS: FULL

## 2025-01-04 ASSESSMENT — REFRACTION_AUTOREFRACTION
OD_AXIS: 100
OD_SPHERE: +1.00
OS_SPHERE: +1.25
OS_AXIS: 074
OS_CYLINDER: -0.25
OD_CYLINDER: -0.25

## 2025-01-04 ASSESSMENT — VISUAL ACUITY
OD_BCVA: 20/20-3
OS_BCVA: 20/20-1

## 2025-01-04 ASSESSMENT — SUPERFICIAL PUNCTATE KERATITIS (SPK)
OS_SPK: 2+
OD_SPK: 2+

## 2025-01-14 ENCOUNTER — APPOINTMENT (OUTPATIENT)
Dept: CARDIOLOGY | Facility: CLINIC | Age: 60
End: 2025-01-14

## 2025-02-01 ENCOUNTER — OFFICE (OUTPATIENT)
Facility: LOCATION | Age: 60
Setting detail: OPHTHALMOLOGY
End: 2025-02-01
Payer: COMMERCIAL

## 2025-02-01 DIAGNOSIS — H01.005: ICD-10-CM

## 2025-02-01 DIAGNOSIS — H01.001: ICD-10-CM

## 2025-02-01 DIAGNOSIS — H57.02: ICD-10-CM

## 2025-02-01 DIAGNOSIS — H01.002: ICD-10-CM

## 2025-02-01 DIAGNOSIS — H53.40: ICD-10-CM

## 2025-02-01 DIAGNOSIS — H01.004: ICD-10-CM

## 2025-02-01 DIAGNOSIS — H16.223: ICD-10-CM

## 2025-02-01 DIAGNOSIS — H52.03: ICD-10-CM

## 2025-02-01 DIAGNOSIS — H10.45: ICD-10-CM

## 2025-02-01 DIAGNOSIS — H47.293: ICD-10-CM

## 2025-02-01 DIAGNOSIS — H25.13: ICD-10-CM

## 2025-02-01 DIAGNOSIS — H52.7: ICD-10-CM

## 2025-02-01 PROCEDURE — 92015 DETERMINE REFRACTIVE STATE: CPT | Performed by: STUDENT IN AN ORGANIZED HEALTH CARE EDUCATION/TRAINING PROGRAM

## 2025-02-01 PROCEDURE — 92012 INTRM OPH EXAM EST PATIENT: CPT | Performed by: STUDENT IN AN ORGANIZED HEALTH CARE EDUCATION/TRAINING PROGRAM

## 2025-02-01 ASSESSMENT — REFRACTION_MANIFEST
OU_VA: 20/20
OS_ADD: +2.50
OS_CYLINDER: SPHERE
OS_AXIS: 000
OS_VA2: 20/20
OD_SPHERE: +1.00
OS_ADD: +2.75
OD_CYLINDER: SPH
OS_SPHERE: +1.00
OS_CYLINDER: -0.50
OD_VA1: 20/20
OD_VA1: 20/20
OS_CYLINDER: -0.50
OD_SPHERE: +1.50
OS_ADD: +2.25
OD_CYLINDER: SPHERE
OS_VA2: 20/20
OD_VA1: 20/20
OS_CYLINDER: -0.25
OD_SPHERE: +0.25
OD_SPHERE: +1.00
OS_CYLINDER: SPH
OS_ADD: +2.00
OD_ADD: +2.25
OD_CYLINDER: SPH
OS_SPHERE: +0.50
OS_VA1: 20/20
OD_CYLINDER: SPH
OS_SPHERE: +1.75
OS_VA1: 20/20
OS_VA1: 20/25
OD_VA1: 20/20-2
OS_CYLINDER: SPHERE
OS_VA1: 20/20
OS_CYLINDER: SPH
OS_SPHERE: +0.75
OD_AXIS: 000
OS_AXIS: 070
OD_CYLINDER: -0.75
OS_ADD: +2.50
OS_AXIS: 000
OD_VA2: 20/20
OS_VA1: 20/20
OS_VA2: 20/20
OD_AXIS: 100
OS_SPHERE: +0.75
OD_ADD: +1.00
OD_VA2: 20/20
OS_SPHERE: +1.50
OS_ADD: +1.00
OD_SPHERE: +0.50
OD_ADD: +2.00
OS_ADD: +2.75
OD_ADD: +2.50
OD_VA1: 20/20
OS_SPHERE: +1.25
OS_CYLINDER: SPH
OD_VA1: 20/20
OD_SPHERE: +1.75
OD_ADD: +2.75
OD_SPHERE: +0.25
OD_CYLINDER: SPHERE
OD_CYLINDER: SPH
OD_SPHERE: +0.25
OD_VA2: 20/20
OD_ADD: +2.50
OD_CYLINDER: SPH
OS_AXIS: 144
OS_SPHERE: +0.75
OS_VA1: 20/20
OD_ADD: +2.75
OD_AXIS: 000
OS_AXIS: 144

## 2025-02-01 ASSESSMENT — REFRACTION_CURRENTRX
OD_OVR_VA: 20/
OD_ADD: +2.50
OD_AXIS: 166
OS_ADD: +2.25
OS_CYLINDER: 0.00
OS_CYLINDER: -0.75
OS_VPRISM_DIRECTION: PROGS
OS_SPHERE: +0.50
OS_VPRISM_DIRECTION: PROGS
OS_AXIS: 090
OD_ADD: +2.00
OD_CYLINDER: 0.00
OD_SPHERE: +0.25
OS_AXIS: 007
OS_OVR_VA: 20/
OD_CYLINDER: -0.50
OD_ADD: +2.75
OD_VPRISM_DIRECTION: PROGS
OS_ADD: +2.75
OS_VPRISM_DIRECTION: PROGS
OD_VPRISM_DIRECTION: PROGS
OS_SPHERE: +2.75
OD_AXIS: 180
OS_AXIS: 180
OS_CYLINDER: SPH
OD_VPRISM_DIRECTION: PROGS
OD_AXIS: 017
OD_SPHERE: +2.75
OS_SPHERE: +1.50
OD_CYLINDER: -0.25
OD_CYLINDER: +0.25
OS_ADD: +2.50
OD_AXIS: 091
OD_OVR_VA: 20/
OS_SPHERE: +0.75
OD_SPHERE: +0.25
OD_SPHERE: +0.50
OS_CYLINDER: -0.25
OD_ADD: +2.25
OS_SPHERE: +0.75
OD_SPHERE: +0.75
OD_AXIS: 099
OD_CYLINDER: -0.50
OS_OVR_VA: 20/
OS_OVR_VA: 20/
OD_OVR_VA: 20/
OS_ADD: +2.00

## 2025-02-01 ASSESSMENT — CONFRONTATIONAL VISUAL FIELD TEST (CVF)
OD_FINDINGS: FULL
OS_FINDINGS: FULL

## 2025-02-01 ASSESSMENT — KERATOMETRY
METHOD_AUTO_MANUAL: AUTO
OS_AXISANGLE_DEGREES: 090
OD_AXISANGLE_DEGREES: 080
OD_K2POWER_DIOPTERS: 46.00
OD_K1POWER_DIOPTERS: 45.75
OS_K2POWER_DIOPTERS: 46.50
OS_K1POWER_DIOPTERS: 46.50

## 2025-02-01 ASSESSMENT — REFRACTION_AUTOREFRACTION
OD_SPHERE: +1.00
OS_AXIS: 074
OS_CYLINDER: -0.25
OS_SPHERE: +1.25
OD_AXIS: 100
OD_CYLINDER: -0.25

## 2025-02-01 ASSESSMENT — VISUAL ACUITY
OD_BCVA: 20/20
OS_BCVA: 20/20-1

## 2025-02-01 ASSESSMENT — LID EXAM ASSESSMENTS
OS_MEIBOMITIS: 2+
OD_MEIBOMITIS: 2+

## 2025-02-01 ASSESSMENT — SUPERFICIAL PUNCTATE KERATITIS (SPK)
OS_SPK: 2+
OD_SPK: 2+

## 2025-02-21 ENCOUNTER — OFFICE (OUTPATIENT)
Dept: URBAN - METROPOLITAN AREA CLINIC 27 | Facility: CLINIC | Age: 60
Setting detail: OPHTHALMOLOGY
End: 2025-02-21
Payer: COMMERCIAL

## 2025-02-21 ENCOUNTER — RX ONLY (RX ONLY)
Age: 60
End: 2025-02-21

## 2025-02-21 DIAGNOSIS — H01.001: ICD-10-CM

## 2025-02-21 DIAGNOSIS — H01.002: ICD-10-CM

## 2025-02-21 DIAGNOSIS — H01.004: ICD-10-CM

## 2025-02-21 DIAGNOSIS — H01.005: ICD-10-CM

## 2025-02-21 PROBLEM — J01.90 ACUTE SINUSITIS: Status: ACTIVE | Noted: 2025-02-21

## 2025-02-21 PROBLEM — H10.45 ALLERGIC CONJUNCTIVITIS: Status: ACTIVE | Noted: 2025-02-01

## 2025-02-21 PROCEDURE — 92012 INTRM OPH EXAM EST PATIENT: CPT | Performed by: OPTOMETRIST

## 2025-02-21 ASSESSMENT — LID EXAM ASSESSMENTS
OD_MEIBOMITIS: 2+
OS_MEIBOMITIS: 2+
OS_BLEPHARITIS: LLL LUL 3+
OD_BLEPHARITIS: RLL RUL 3+

## 2025-02-21 ASSESSMENT — CORNEAL EDEMA CLINICAL DESCRIPTION
OS_CORNEALEDEMA: 1+ 2+
OD_CORNEALEDEMA: 1+ 2+

## 2025-02-21 ASSESSMENT — TEAR BREAK UP TIME (TBUT)
OD_TBUT: 6 SECS
OS_TBUT: 6 SECS

## 2025-02-21 ASSESSMENT — TONOMETRY
OS_IOP_MMHG: 15
OD_IOP_MMHG: 18

## 2025-02-22 PROBLEM — H01.005 BLEPHARITIS; RIGHT UPPER LID, RIGHT LOWER LID, LEFT UPPER LID, LEFT LOWER LID: Status: ACTIVE | Noted: 2025-02-21

## 2025-02-22 PROBLEM — H01.002 BLEPHARITIS; RIGHT UPPER LID, RIGHT LOWER LID, LEFT UPPER LID, LEFT LOWER LID: Status: ACTIVE | Noted: 2025-02-21

## 2025-02-22 PROBLEM — H01.001 BLEPHARITIS; RIGHT UPPER LID, RIGHT LOWER LID, LEFT UPPER LID, LEFT LOWER LID: Status: ACTIVE | Noted: 2025-02-21

## 2025-02-22 PROBLEM — H01.004 BLEPHARITIS; RIGHT UPPER LID, RIGHT LOWER LID, LEFT UPPER LID, LEFT LOWER LID: Status: ACTIVE | Noted: 2025-02-21

## 2025-02-22 ASSESSMENT — REFRACTION_CURRENTRX
OD_AXIS: 166
OS_AXIS: 180
OD_SPHERE: +2.75
OS_SPHERE: +1.50
OS_VPRISM_DIRECTION: PROGS
OD_ADD: +2.25
OS_CYLINDER: -0.75
OS_VPRISM_DIRECTION: PROGS
OD_AXIS: 180
OS_SPHERE: +0.75
OD_AXIS: 099
OS_SPHERE: +0.50
OD_SPHERE: +0.25
OS_OVR_VA: 20/
OD_CYLINDER: -0.50
OS_ADD: +2.00
OD_CYLINDER: +0.25
OS_VPRISM_DIRECTION: PROGS
OD_VPRISM_DIRECTION: PROGS
OS_SPHERE: +2.75
OS_SPHERE: +0.75
OD_OVR_VA: 20/
OS_CYLINDER: SPH
OD_ADD: +2.50
OD_SPHERE: +0.50
OS_ADD: +2.50
OD_CYLINDER: -0.50
OS_CYLINDER: 0.00
OD_VPRISM_DIRECTION: PROGS
OD_AXIS: 017
OD_OVR_VA: 20/
OS_AXIS: 007
OD_CYLINDER: 0.00
OS_CYLINDER: -0.25
OD_SPHERE: +0.75
OD_CYLINDER: -0.25
OS_AXIS: 090
OS_OVR_VA: 20/
OD_ADD: +2.00
OD_AXIS: 091
OD_VPRISM_DIRECTION: PROGS
OS_ADD: +2.25
OS_ADD: +2.75
OD_SPHERE: +0.25
OD_OVR_VA: 20/
OS_OVR_VA: 20/
OD_ADD: +2.75

## 2025-02-22 ASSESSMENT — REFRACTION_MANIFEST
OD_CYLINDER: SPH
OD_SPHERE: +1.50
OD_VA1: 20/20
OD_VA1: 20/20
OS_SPHERE: +1.00
OS_ADD: +2.75
OS_AXIS: 000
OD_VA2: 20/20
OS_AXIS: 144
OD_SPHERE: +0.50
OD_ADD: +2.75
OS_SPHERE: +0.75
OD_ADD: +2.50
OS_VA1: 20/25
OS_VA1: 20/20
OD_SPHERE: +0.25
OD_VA1: 20/20-2
OS_SPHERE: +0.75
OD_ADD: +2.75
OS_VA2: 20/20
OD_ADD: +2.00
OS_AXIS: 144
OS_VA1: 20/20
OS_CYLINDER: SPH
OD_CYLINDER: -0.75
OS_VA2: 20/20
OU_VA: 20/20
OS_CYLINDER: SPH
OS_SPHERE: +0.75
OD_CYLINDER: SPHERE
OS_AXIS: 000
OS_ADD: +2.50
OD_ADD: +1.00
OD_AXIS: 100
OS_VA2: 20/20
OD_ADD: +2.25
OD_SPHERE: +1.00
OS_ADD: +2.75
OS_SPHERE: +1.25
OD_SPHERE: +1.00
OS_VA1: 20/20
OS_AXIS: 070
OS_CYLINDER: -0.50
OS_SPHERE: +1.50
OD_AXIS: 000
OD_CYLINDER: SPH
OD_VA2: 20/20
OD_VA1: 20/20
OD_VA2: 20/20
OS_CYLINDER: SPHERE
OD_CYLINDER: SPH
OS_ADD: +2.00
OS_VA1: 20/20
OD_CYLINDER: SPH
OD_SPHERE: +1.75
OS_CYLINDER: SPHERE
OD_VA1: 20/20
OS_CYLINDER: SPH
OS_SPHERE: +1.75
OD_ADD: +2.50
OD_SPHERE: +0.25
OD_CYLINDER: SPH
OS_ADD: +2.25
OD_SPHERE: +0.25
OS_CYLINDER: -0.25
OS_CYLINDER: -0.50
OS_SPHERE: +0.50
OS_ADD: +2.50
OD_VA1: 20/20
OS_VA1: 20/20
OD_AXIS: 000
OS_ADD: +1.00
OD_CYLINDER: SPHERE

## 2025-02-22 ASSESSMENT — KERATOMETRY
OS_AXISANGLE_DEGREES: 083
METHOD_AUTO_MANUAL: AUTO
OS_K2POWER_DIOPTERS: 47.00
OD_K1POWER_DIOPTERS: 45.75
OS_K1POWER_DIOPTERS: 46.25
OD_K2POWER_DIOPTERS: 46.00
OD_AXISANGLE_DEGREES: 043

## 2025-02-22 ASSESSMENT — REFRACTION_AUTOREFRACTION
OS_AXIS: 114
OD_AXIS: 006
OS_CYLINDER: +0.25
OS_SPHERE: +1.00
OD_SPHERE: +0.75
OD_CYLINDER: +0.75

## 2025-02-22 ASSESSMENT — VISUAL ACUITY
OS_BCVA: 20/30+1
OD_BCVA: 20/30

## 2025-03-07 ENCOUNTER — OFFICE (OUTPATIENT)
Dept: URBAN - METROPOLITAN AREA CLINIC 27 | Facility: CLINIC | Age: 60
Setting detail: OPHTHALMOLOGY
End: 2025-03-07
Payer: COMMERCIAL

## 2025-03-07 DIAGNOSIS — H01.004: ICD-10-CM

## 2025-03-07 DIAGNOSIS — H01.002: ICD-10-CM

## 2025-03-07 DIAGNOSIS — H01.005: ICD-10-CM
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
## 2025-03-07 DIAGNOSIS — H01.001: ICD-10-CM

## 2025-03-07 PROBLEM — H04.223 EPIPHORA- DUE TO INSUFFICIENT DRAINAGE; BOTH EYES: Status: ACTIVE | Noted: 2025-03-07

## 2025-03-07 PROCEDURE — BRUDER EYE BRUDER EYE PADS: Performed by: OPTOMETRIST

## 2025-03-07 PROCEDURE — 99213 OFFICE O/P EST LOW 20 MIN: CPT | Performed by: OPTOMETRIST

## 2025-03-07 ASSESSMENT — REFRACTION_CURRENTRX
OD_VPRISM_DIRECTION: PROGS
OS_AXIS: 180
OS_AXIS: 007
OS_SPHERE: +0.50
OD_VPRISM_DIRECTION: PROGS
OD_CYLINDER: +0.25
OS_ADD: +2.00
OS_SPHERE: +1.50
OS_OVR_VA: 20/
OS_SPHERE: +2.75
OD_ADD: +2.00
OS_ADD: +2.75
OS_CYLINDER: SPH
OS_OVR_VA: 20/
OS_VPRISM_DIRECTION: PROGS
OD_VPRISM_DIRECTION: PROGS
OS_SPHERE: +0.75
OD_OVR_VA: 20/
OS_VPRISM_DIRECTION: PROGS
OS_SPHERE: +0.75
OD_CYLINDER: -0.25
OD_OVR_VA: 20/
OD_ADD: +2.50
OS_OVR_VA: 20/
OD_SPHERE: +2.75
OD_AXIS: 166
OD_CYLINDER: -0.50
OS_CYLINDER: -0.75
OS_CYLINDER: 0.00
OD_CYLINDER: 0.00
OD_ADD: +2.75
OD_ADD: +2.25
OD_SPHERE: +0.50
OS_ADD: +2.25
OS_CYLINDER: -0.25
OD_AXIS: 091
OD_AXIS: 017
OD_CYLINDER: -0.50
OD_AXIS: 180
OS_VPRISM_DIRECTION: PROGS
OD_SPHERE: +0.25
OD_AXIS: 099
OD_SPHERE: +0.75
OS_AXIS: 090
OS_ADD: +2.50
OD_SPHERE: +0.25
OD_OVR_VA: 20/

## 2025-03-07 ASSESSMENT — REFRACTION_MANIFEST
OS_AXIS: 144
OD_VA2: 20/20
OS_CYLINDER: SPHERE
OS_SPHERE: +1.00
OD_ADD: +2.25
OD_SPHERE: +1.00
OS_AXIS: 070
OD_ADD: +2.50
OS_CYLINDER: -0.25
OD_AXIS: 100
OD_SPHERE: +0.50
OD_VA1: 20/20
OS_VA1: 20/20
OD_CYLINDER: SPH
OS_VA2: 20/20
OS_ADD: +2.50
OS_CYLINDER: SPH
OS_AXIS: 144
OD_SPHERE: +1.75
OD_VA1: 20/20
OS_CYLINDER: -0.50
OD_VA2: 20/20
OS_CYLINDER: SPH
OD_ADD: +2.75
OD_SPHERE: +0.25
OS_SPHERE: +0.75
OS_SPHERE: +0.75
OS_VA1: 20/20
OD_ADD: +2.00
OS_ADD: +2.25
OD_SPHERE: +1.00
OS_CYLINDER: SPHERE
OD_VA1: 20/20
OS_VA1: 20/20
OS_SPHERE: +0.75
OS_SPHERE: +1.50
OS_ADD: +2.00
OD_CYLINDER: SPH
OS_SPHERE: +1.25
OD_CYLINDER: -0.75
OU_VA: 20/20
OD_CYLINDER: SPH
OS_VA1: 20/25
OS_VA2: 20/20
OS_ADD: +2.75
OS_SPHERE: +1.75
OD_VA1: 20/20-2
OS_ADD: +2.50
OS_VA1: 20/20
OD_AXIS: 000
OD_SPHERE: +0.25
OD_CYLINDER: SPH
OS_VA1: 20/20
OD_CYLINDER: SPH
OD_VA2: 20/20
OS_AXIS: 000
OD_SPHERE: +0.25
OD_VA1: 20/20
OD_CYLINDER: SPHERE
OS_CYLINDER: SPH
OS_ADD: +2.75
OD_ADD: +2.75
OD_ADD: +2.50
OS_CYLINDER: -0.50
OD_ADD: +1.00
OS_SPHERE: +0.50
OS_VA2: 20/20
OD_SPHERE: +1.50
OD_AXIS: 000
OS_ADD: +1.00
OS_AXIS: 000
OD_VA1: 20/20
OD_CYLINDER: SPHERE

## 2025-03-07 ASSESSMENT — TONOMETRY
OD_IOP_MMHG: 15
OS_IOP_MMHG: 12

## 2025-03-07 ASSESSMENT — KERATOMETRY
OD_AXISANGLE_DEGREES: 68
OD_K1POWER_DIOPTERS: 45.75
OD_K2POWER_DIOPTERS: 46.25
OS_AXISANGLE_DEGREES: 70
OS_K2POWER_DIOPTERS: 46.50
OS_K1POWER_DIOPTERS: 46.25
METHOD_AUTO_MANUAL: AUTO

## 2025-03-07 ASSESSMENT — TEAR BREAK UP TIME (TBUT)
OS_TBUT: 1 SEC
OD_TBUT: 1 SEC

## 2025-03-07 ASSESSMENT — LID EXAM ASSESSMENTS
OS_MEIBOMITIS: 2+
OS_BLEPHARITIS: LLL LUL 2+
OD_MEIBOMITIS: 2+
OD_BLEPHARITIS: RLL RUL 2+

## 2025-03-07 ASSESSMENT — VISUAL ACUITY
OS_BCVA: 20/25
OD_BCVA: 20/25-1

## 2025-03-07 ASSESSMENT — REFRACTION_AUTOREFRACTION
OS_CYLINDER: +0.25
OD_CYLINDER: +0.75
OS_SPHERE: +1.25
OD_SPHERE: +0.50
OS_AXIS: 163
OD_AXIS: 14

## 2025-03-07 ASSESSMENT — SUPERFICIAL PUNCTATE KERATITIS (SPK)
OS_SPK: T
OD_SPK: T

## 2025-04-05 ENCOUNTER — OFFICE (OUTPATIENT)
Facility: LOCATION | Age: 60
Setting detail: OPHTHALMOLOGY
End: 2025-04-05
Payer: COMMERCIAL

## 2025-04-05 DIAGNOSIS — H01.004: ICD-10-CM

## 2025-04-05 DIAGNOSIS — H47.293: ICD-10-CM

## 2025-04-05 DIAGNOSIS — H01.005: ICD-10-CM

## 2025-04-05 DIAGNOSIS — H01.002: ICD-10-CM

## 2025-04-05 DIAGNOSIS — H01.001: ICD-10-CM

## 2025-04-05 PROCEDURE — 76514 ECHO EXAM OF EYE THICKNESS: CPT | Performed by: STUDENT IN AN ORGANIZED HEALTH CARE EDUCATION/TRAINING PROGRAM

## 2025-04-05 PROCEDURE — 92250 FUNDUS PHOTOGRAPHY W/I&R: CPT | Performed by: STUDENT IN AN ORGANIZED HEALTH CARE EDUCATION/TRAINING PROGRAM

## 2025-04-05 PROCEDURE — 92012 INTRM OPH EXAM EST PATIENT: CPT | Performed by: STUDENT IN AN ORGANIZED HEALTH CARE EDUCATION/TRAINING PROGRAM

## 2025-04-05 ASSESSMENT — VISUAL ACUITY
OD_BCVA: 20/20
OS_BCVA: 20/20

## 2025-04-05 ASSESSMENT — REFRACTION_AUTOREFRACTION
OS_SPHERE: +1.25
OD_AXIS: 14
OD_SPHERE: +0.50
OD_CYLINDER: +0.75
OS_AXIS: 163
OS_CYLINDER: +0.25

## 2025-04-05 ASSESSMENT — REFRACTION_MANIFEST
OD_ADD: +2.75
OD_VA1: 20/20
OD_SPHERE: +0.25
OS_CYLINDER: SPH
OS_CYLINDER: -0.25
OS_CYLINDER: SPHERE
OS_SPHERE: +0.75
OS_SPHERE: +1.75
OS_ADD: +1.00
OS_CYLINDER: SPH
OD_ADD: +2.50
OD_CYLINDER: SPH
OD_CYLINDER: SPH
OS_VA2: 20/20
OD_SPHERE: +1.00
OD_CYLINDER: SPHERE
OD_ADD: +2.75
OD_VA2: 20/20
OS_ADD: +2.50
OS_ADD: +2.75
OS_VA2: 20/20
OS_ADD: +2.50
OS_SPHERE: +0.75
OS_AXIS: 144
OS_AXIS: 144
OD_CYLINDER: -0.75
OS_CYLINDER: SPHERE
OS_SPHERE: +1.00
OS_VA1: 20/20
OS_ADD: +2.00
OD_CYLINDER: SPH
OS_SPHERE: +0.75
OS_AXIS: 000
OS_SPHERE: +1.50
OD_CYLINDER: SPH
OD_ADD: +2.25
OD_VA2: 20/20
OS_SPHERE: +0.50
OS_VA1: 20/20
OD_VA1: 20/20
OD_SPHERE: +0.25
OD_SPHERE: +1.50
OS_AXIS: 070
OD_ADD: +2.00
OD_ADD: +2.50
OD_VA1: 20/20
OD_CYLINDER: SPHERE
OD_CYLINDER: SPH
OS_CYLINDER: -0.50
OD_SPHERE: +0.25
OS_VA2: 20/20
OS_SPHERE: +1.25
OS_VA1: 20/20
OS_ADD: +2.25
OD_AXIS: 000
OU_VA: 20/20
OD_VA2: 20/20
OD_ADD: +1.00
OS_VA1: 20/20
OD_SPHERE: +1.75
OD_AXIS: 000
OS_CYLINDER: SPH
OS_VA1: 20/20
OS_ADD: +2.75
OS_CYLINDER: -0.50
OS_VA1: 20/25
OS_AXIS: 000
OD_SPHERE: +1.00
OD_VA1: 20/20
OD_SPHERE: +0.50
OD_VA1: 20/20-2
OD_VA1: 20/20
OD_AXIS: 100

## 2025-04-05 ASSESSMENT — PACHYMETRY
OD_CT_CORRECTION: 3
OD_CT_UM: 503
OS_CT_CORRECTION: 2
OS_CT_UM: 512

## 2025-04-05 ASSESSMENT — REFRACTION_CURRENTRX
OS_VPRISM_DIRECTION: PROGS
OD_SPHERE: +2.75
OD_ADD: +2.75
OS_AXIS: 180
OS_OVR_VA: 20/
OD_CYLINDER: +0.25
OS_CYLINDER: SPH
OD_ADD: +2.50
OS_CYLINDER: -0.25
OS_SPHERE: +2.75
OD_CYLINDER: -0.50
OS_SPHERE: +0.75
OD_VPRISM_DIRECTION: PROGS
OS_ADD: +2.50
OS_OVR_VA: 20/
OD_SPHERE: +0.75
OS_VPRISM_DIRECTION: PROGS
OS_SPHERE: +0.50
OS_SPHERE: +0.75
OD_AXIS: 166
OD_ADD: +2.25
OS_CYLINDER: 0.00
OD_CYLINDER: 0.00
OD_SPHERE: +0.25
OD_OVR_VA: 20/
OS_SPHERE: +1.50
OD_SPHERE: +0.50
OD_SPHERE: +0.25
OS_VPRISM_DIRECTION: PROGS
OS_ADD: +2.25
OD_ADD: +2.00
OD_OVR_VA: 20/
OD_VPRISM_DIRECTION: PROGS
OS_AXIS: 090
OS_AXIS: 007
OS_ADD: +2.75
OD_AXIS: 099
OS_CYLINDER: -0.75
OD_OVR_VA: 20/
OD_AXIS: 091
OS_ADD: +2.00
OD_CYLINDER: -0.50
OD_AXIS: 017
OD_VPRISM_DIRECTION: PROGS
OD_CYLINDER: -0.25
OS_OVR_VA: 20/
OD_AXIS: 180

## 2025-04-05 ASSESSMENT — TEAR BREAK UP TIME (TBUT)
OD_TBUT: 1 SEC
OS_TBUT: 1 SEC

## 2025-04-05 ASSESSMENT — LID EXAM ASSESSMENTS
OD_MEIBOMITIS: 2+
OS_MEIBOMITIS: 2+
OD_BLEPHARITIS: RLL RUL 2+
OS_BLEPHARITIS: LLL LUL 2+

## 2025-04-05 ASSESSMENT — SUPERFICIAL PUNCTATE KERATITIS (SPK)
OD_SPK: T
OS_SPK: T

## 2025-04-05 ASSESSMENT — KERATOMETRY
METHOD_AUTO_MANUAL: AUTO
OD_K2POWER_DIOPTERS: 46.25
OS_K1POWER_DIOPTERS: 46.25
OD_K1POWER_DIOPTERS: 45.75
OS_AXISANGLE_DEGREES: 70
OS_K2POWER_DIOPTERS: 46.50
OD_AXISANGLE_DEGREES: 68

## 2025-04-05 ASSESSMENT — CONFRONTATIONAL VISUAL FIELD TEST (CVF)
OS_FINDINGS: FULL
OD_FINDINGS: FULL

## 2025-04-18 ENCOUNTER — OFFICE (OUTPATIENT)
Dept: URBAN - METROPOLITAN AREA CLINIC 27 | Facility: CLINIC | Age: 60
Setting detail: OPHTHALMOLOGY
End: 2025-04-18
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
## 2025-04-18 PROCEDURE — NO SHOW FE NO SHOW FEE: Performed by: OPTOMETRIST

## 2025-06-07 NOTE — H&P PST ADULT - NSICDXPASTSURGICALHX_GEN_ALL_CORE_FT
PAST SURGICAL HISTORY:  H/O rhinoplasty ~10 yrs ago    History of incisional hernia repair mesh 2019    S/P cholecystectomy      No

## 2025-08-25 ENCOUNTER — OFFICE (OUTPATIENT)
Facility: LOCATION | Age: 60
Setting detail: OPHTHALMOLOGY
End: 2025-08-25
Payer: COMMERCIAL

## 2025-08-25 DIAGNOSIS — H16.223: ICD-10-CM

## 2025-08-25 DIAGNOSIS — H52.7: ICD-10-CM

## 2025-08-25 PROCEDURE — 99212 OFFICE O/P EST SF 10 MIN: CPT | Performed by: STUDENT IN AN ORGANIZED HEALTH CARE EDUCATION/TRAINING PROGRAM

## 2025-08-25 PROCEDURE — 92015 DETERMINE REFRACTIVE STATE: CPT | Performed by: STUDENT IN AN ORGANIZED HEALTH CARE EDUCATION/TRAINING PROGRAM

## 2025-08-25 ASSESSMENT — REFRACTION_CURRENTRX
OS_SPHERE: +2.75
OS_CYLINDER: 0.00
OS_CYLINDER: -0.75
OS_SPHERE: +0.75
OD_OVR_VA: 20/
OD_VPRISM_DIRECTION: PROGS
OD_ADD: +2.25
OS_VPRISM_DIRECTION: PROGS
OD_ADD: +2.75
OD_CYLINDER: -0.25
OD_SPHERE: +2.75
OD_SPHERE: +0.50
OD_OVR_VA: 20/
OD_AXIS: 180
OS_AXIS: 090
OS_SPHERE: +0.75
OS_ADD: +2.00
OD_VPRISM_DIRECTION: PROGS
OD_AXIS: 099
OS_AXIS: 007
OD_OVR_VA: 20/
OS_ADD: +2.25
OS_OVR_VA: 20/
OS_CYLINDER: SPH
OD_SPHERE: +0.25
OD_CYLINDER: +0.25
OS_SPHERE: +1.50
OD_VPRISM_DIRECTION: PROGS
OS_ADD: +2.75
OD_ADD: +2.00
OS_ADD: +2.50
OD_AXIS: 017
OD_AXIS: 091
OD_AXIS: 166
OD_SPHERE: +0.25
OD_CYLINDER: -0.50
OD_CYLINDER: -0.50
OS_AXIS: 180
OD_CYLINDER: 0.00
OS_VPRISM_DIRECTION: PROGS
OS_VPRISM_DIRECTION: PROGS
OS_SPHERE: +0.50
OS_OVR_VA: 20/
OD_ADD: +2.50
OS_CYLINDER: -0.25
OS_OVR_VA: 20/
OD_SPHERE: +0.75

## 2025-08-25 ASSESSMENT — REFRACTION_MANIFEST
OS_AXIS: 070
OS_VA1: 20/25
OD_SPHERE: +0.25
OD_AXIS: 000
OS_AXIS: 000
OD_CYLINDER: -0.50
OS_CYLINDER: -0.25
OD_CYLINDER: SPHERE
OD_SPHERE: +0.25
OD_VA1: 20/20
OD_CYLINDER: SPHERE
OS_CYLINDER: SPH
OS_CYLINDER: SPHERE
OS_ADD: +2.25
OS_VA1: 20/20
OS_ADD: +2.50
OD_SPHERE: +1.00
OD_VA1: 20/20
OD_AXIS: 000
OD_CYLINDER: SPH
OD_SPHERE: +0.50
OS_VA1: 20/20
OS_ADD: +2.75
OD_CYLINDER: SPH
OS_VA1: 20/20
OS_CYLINDER: -0.50
OU_VA: 20/20
OS_AXIS: 085
OD_VA2: 20/20
OS_VA1: 20/20
OS_VA2: 20/20
OD_VA1: 20/20
OD_CYLINDER: SPH
OS_CYLINDER: -0.50
OS_ADD: +2.75
OS_SPHERE: +1.00
OS_AXIS: 144
OD_CYLINDER: -0.50
OS_AXIS: 144
OD_SPHERE: +1.50
OD_ADD: +2.75
OD_SPHERE: +1.00
OS_CYLINDER: SPH
OS_AXIS: 000
OD_SPHERE: +1.25
OS_AXIS: 085
OS_ADD: +2.00
OS_VA1: 20/20
OS_SPHERE: +0.50
OS_ADD: +2.00
OS_ADD: +1.00
OS_VA2: 20/20
OD_ADD: +2.75
OD_ADD: +2.75
OS_CYLINDER: SPH
OD_ADD: +2.50
OS_ADD: +2.75
OD_ADD: +1.00
OS_SPHERE: +0.75
OD_VA1: 20/20-2
OS_SPHERE: +1.25
OD_ADD: +2.00
OD_CYLINDER: -0.75
OS_SPHERE: +1.00
OD_VA2: 20/20
OS_VA1: 20/20
OS_CYLINDER: -0.25
OD_CYLINDER: SPH
OD_VA1: 20/20
OS_VA2: 20/20
OD_AXIS: 095
OD_CYLINDER: SPH
OS_CYLINDER: SPHERE
OD_VA1: 20/20
OD_AXIS: 095
OS_CYLINDER: -0.50
OS_SPHERE: +0.75
OS_SPHERE: +0.75
OD_SPHERE: +0.25
OD_ADD: +2.25
OD_SPHERE: +1.00
OD_VA2: 20/20
OD_VA1: 20/20
OD_ADD: +2.00
OU_VA: 20/20
OD_SPHERE: +1.75
OS_SPHERE: +1.50
OS_VA1: 20/20
OS_ADD: +2.50
OS_SPHERE: +1.75
OD_AXIS: 100
OU_VA: 20/20
OS_SPHERE: +1.25
OD_VA1: 20/20
OD_ADD: +2.50

## 2025-08-25 ASSESSMENT — REFRACTION_AUTOREFRACTION
OD_SPHERE: +1.50
OS_AXIS: 088
OS_SPHERE: +2.00
OD_CYLINDER: -0.75
OD_AXIS: 093
OS_CYLINDER: -0.75

## 2025-08-25 ASSESSMENT — KERATOMETRY
METHOD_AUTO_MANUAL: AUTO
OS_K1POWER_DIOPTERS: 46.25
OS_AXISANGLE_DEGREES: 090
OD_K2POWER_DIOPTERS: 45.75
OD_AXISANGLE_DEGREES: 090
OD_K1POWER_DIOPTERS: 45.75
OS_K2POWER_DIOPTERS: 46.25

## 2025-08-25 ASSESSMENT — PACHYMETRY
OS_CT_UM: 512
OD_CT_UM: 503
OS_CT_CORRECTION: 2
OD_CT_CORRECTION: 3

## 2025-08-25 ASSESSMENT — CONFRONTATIONAL VISUAL FIELD TEST (CVF)
OD_FINDINGS: FULL
OS_FINDINGS: FULL

## 2025-08-25 ASSESSMENT — TONOMETRY
OD_IOP_MMHG: 17
OS_IOP_MMHG: 16

## 2025-08-25 ASSESSMENT — VISUAL ACUITY
OS_BCVA: 20/25-3
OD_BCVA: 20/30